# Patient Record
Sex: FEMALE | Race: WHITE | NOT HISPANIC OR LATINO | Employment: FULL TIME | URBAN - METROPOLITAN AREA
[De-identification: names, ages, dates, MRNs, and addresses within clinical notes are randomized per-mention and may not be internally consistent; named-entity substitution may affect disease eponyms.]

---

## 2023-03-23 LAB
ABO GROUP (TYPE) IN BLOOD: NORMAL
ANION GAP IN SER/PLAS: 11 MMOL/L (ref 10–20)
ANTIBODY SCREEN: NORMAL
CALCIUM (MG/DL) IN SER/PLAS: 9.1 MG/DL (ref 8.6–10.3)
CARBON DIOXIDE, TOTAL (MMOL/L) IN SER/PLAS: 26 MMOL/L (ref 21–32)
CHLORIDE (MMOL/L) IN SER/PLAS: 105 MMOL/L (ref 98–107)
CREATININE (MG/DL) IN SER/PLAS: 0.57 MG/DL (ref 0.5–1.05)
ERYTHROCYTE DISTRIBUTION WIDTH (RATIO) BY AUTOMATED COUNT: 12.5 % (ref 11.5–14.5)
ERYTHROCYTE MEAN CORPUSCULAR HEMOGLOBIN CONCENTRATION (G/DL) BY AUTOMATED: 32.9 G/DL (ref 32–36)
ERYTHROCYTE MEAN CORPUSCULAR VOLUME (FL) BY AUTOMATED COUNT: 93 FL (ref 80–100)
ERYTHROCYTES (10*6/UL) IN BLOOD BY AUTOMATED COUNT: 4.43 X10E12/L (ref 4–5.2)
GFR FEMALE: >90 ML/MIN/1.73M2
GLUCOSE (MG/DL) IN SER/PLAS: 111 MG/DL (ref 74–99)
HEMATOCRIT (%) IN BLOOD BY AUTOMATED COUNT: 41.3 % (ref 36–46)
HEMOGLOBIN (G/DL) IN BLOOD: 13.6 G/DL (ref 12–16)
LEUKOCYTES (10*3/UL) IN BLOOD BY AUTOMATED COUNT: 7.9 X10E9/L (ref 4.4–11.3)
NRBC (PER 100 WBCS) BY AUTOMATED COUNT: 0 /100 WBC (ref 0–0)
PLATELETS (10*3/UL) IN BLOOD AUTOMATED COUNT: 280 X10E9/L (ref 150–450)
POTASSIUM (MMOL/L) IN SER/PLAS: 3.7 MMOL/L (ref 3.5–5.3)
RH FACTOR: NORMAL
SODIUM (MMOL/L) IN SER/PLAS: 138 MMOL/L (ref 136–145)
UREA NITROGEN (MG/DL) IN SER/PLAS: 18 MG/DL (ref 6–23)

## 2023-03-31 LAB — SARS-COV-2 RESULT: NOT DETECTED

## 2023-04-03 ENCOUNTER — HOSPITAL ENCOUNTER (OUTPATIENT)
Dept: DATA CONVERSION | Facility: HOSPITAL | Age: 73
End: 2023-04-03
Attending: ORTHOPAEDIC SURGERY | Admitting: ORTHOPAEDIC SURGERY
Payer: MEDICARE

## 2023-04-03 DIAGNOSIS — Z85.828 PERSONAL HISTORY OF OTHER MALIGNANT NEOPLASM OF SKIN: ICD-10-CM

## 2023-04-03 DIAGNOSIS — E78.5 HYPERLIPIDEMIA, UNSPECIFIED: ICD-10-CM

## 2023-04-03 DIAGNOSIS — M17.11 UNILATERAL PRIMARY OSTEOARTHRITIS, RIGHT KNEE: ICD-10-CM

## 2023-04-03 DIAGNOSIS — Z72.0 TOBACCO USE: ICD-10-CM

## 2023-04-04 LAB
ANION GAP IN SER/PLAS: NORMAL
BASOPHILS (10*3/UL) IN BLOOD BY AUTOMATED COUNT: NORMAL
BASOPHILS/100 LEUKOCYTES IN BLOOD BY AUTOMATED COUNT: NORMAL
CALCIUM (MG/DL) IN SER/PLAS: NORMAL
CARBON DIOXIDE, TOTAL (MMOL/L) IN SER/PLAS: NORMAL
CHLORIDE (MMOL/L) IN SER/PLAS: NORMAL
CREATININE (MG/DL) IN SER/PLAS: NORMAL
EOSINOPHILS (10*3/UL) IN BLOOD BY AUTOMATED COUNT: NORMAL
EOSINOPHILS/100 LEUKOCYTES IN BLOOD BY AUTOMATED COUNT: NORMAL
ERYTHROCYTE DISTRIBUTION WIDTH (RATIO) BY AUTOMATED COUNT: NORMAL
ERYTHROCYTE MEAN CORPUSCULAR HEMOGLOBIN CONCENTRATION (G/DL) BY AUTOMATED: NORMAL
ERYTHROCYTE MEAN CORPUSCULAR VOLUME (FL) BY AUTOMATED COUNT: NORMAL
ERYTHROCYTES (10*6/UL) IN BLOOD BY AUTOMATED COUNT: NORMAL
GFR FEMALE: NORMAL
GFR MALE: NORMAL
GLUCOSE (MG/DL) IN SER/PLAS: NORMAL
HEMATOCRIT (%) IN BLOOD BY AUTOMATED COUNT: NORMAL
HEMOGLOBIN (G/DL) IN BLOOD: NORMAL
IMMATURE GRANULOCYTES/100 LEUKOCYTES IN BLOOD BY AUTOMATED COUNT: NORMAL
LEUKOCYTES (10*3/UL) IN BLOOD BY AUTOMATED COUNT: NORMAL
LYMPHOCYTES (10*3/UL) IN BLOOD BY AUTOMATED COUNT: NORMAL
LYMPHOCYTES/100 LEUKOCYTES IN BLOOD BY AUTOMATED COUNT: NORMAL
MANUAL DIFFERENTIAL Y/N: NORMAL
MONOCYTES (10*3/UL) IN BLOOD BY AUTOMATED COUNT: NORMAL
MONOCYTES/100 LEUKOCYTES IN BLOOD BY AUTOMATED COUNT: NORMAL
NEUTROPHILS (10*3/UL) IN BLOOD BY AUTOMATED COUNT: NORMAL
NEUTROPHILS/100 LEUKOCYTES IN BLOOD BY AUTOMATED COUNT: NORMAL
NRBC (PER 100 WBCS) BY AUTOMATED COUNT: NORMAL
PLATELETS (10*3/UL) IN BLOOD AUTOMATED COUNT: NORMAL
POTASSIUM (MMOL/L) IN SER/PLAS: NORMAL
SODIUM (MMOL/L) IN SER/PLAS: NORMAL
UREA NITROGEN (MG/DL) IN SER/PLAS: NORMAL

## 2023-09-06 VITALS — HEIGHT: 62 IN | BODY MASS INDEX: 23.61 KG/M2 | WEIGHT: 128.31 LBS

## 2023-09-14 NOTE — DISCHARGE SUMMARY
Send Summary:   Discharge Summary Providers:  Provider Role Provider Name   · Attending Shaq Recinos   · Referring Shaq Recinos   · Primary Viktoriya Souza       Note Recipients: Viktoriya Souza MD Lew, Michael, MD       Discharge:    Summary:   Admission Date: .03-Apr-2023 06:08:00   Discharge Date: 03-Apr-2023   Attending Physician at Discharge: Shaq Recinos   Admission Reason: osteoarthritis right knee   Final Discharge Diagnoses: Osteoarthritis of right  knee   Procedures: Date: 03-Apr-2023 09:45:00  Procedure Name: Right Total Knee Replacement   Condition at Discharge: Satisfactory   Disposition at Discharge: Home Health Care - New   Vital Signs:          Date:            Weight/Scale Type:  Height:   03-Apr-2023 07:23  58.2  kg         157.4  cm  Physical Exam:    Constitutional: Well developed, awake/alert,  no distress, alert and cooperative   Eyes: EOMI, clear sclera   ENMT: mucous membranes moist, no lesions  seen   Respiratory/Thorax: Patent airways, with  good chest expansion, thorax symmetric   Musculoskeletal: right knee dressing dry  and intact.   Bilateral lower extremities distally with intact dorsiflexion/plantarflexion/EHL.  DP palpable 2+/2   Neurological: alert,  intact senses   Lymphatic: No significant lymphadenopathy   Psychological: Appropriate mood and behavior     Hospital Course:    Hospital day #1, postoperative day #0 of right total knee arthroplasty for osteoarthritis right knee.  Patient had satisfactory postop course with no major complications.   She fully participated in physical and Occupational Therapy.  Used her incentive spirometry as directed.  Tolerated diet with no nausea vomiting, passed flatus, and urinated well.  Discharge to home with home health care in satisfactory condition.    Immunizations:    Immunizations:  01-Dec-2021   SARS-CoV-2 (COVID-19): Immunizations, 22-Nov-2021 27-Jan-2021   SARS-CoV-2 (COVID-19): Immunizations, 27-Abdirashid-2021  30-Dec-2020   SARS-CoV-2  (COVID-19): Immunizations, 30-Dec-2020      Discharge Information:    and Continuing Care:   Lab Results - Pending:    None  Radiology Results - Pending: None   Discharge Instructions:    Activity:           activity as tolerated.          May shower..            May not return to school/work.            May not drive.      Nutrition/Diet:           resume normal diet    Wound Care:           Wound Site:   right knee incision          Wound Type:   surgical incision          Instructions:   no lotions, creams, or tub soaks          Other Instructions:   Your right knee dressing is waterproof, you may shower with it on  Remove your right knee dressing on 4/10, you may leave it open to air if incision is dry.    Additional Orders:           Additional Instructions:   Call  for any problems and/or concerns.  *Weight bearing as tolerated with walker  *Maintain knee precautions  *No pillow under affected knee  *wear ace wrap around right knee for 3 days after your surgery: remove ace wrap for shower, pat leg dry and reapply ace wrap  *Raise (elevate) your leg above the level of your heart while you are sitting or lying down (place pillow underneath calf)  *You may shower, do not soak or scrub incision; pat dry  *Apply ice to affected knee as needed to minimize swelling, on 20 minutes, off 20 minutes  *Move your toes often to avoid stiffness and to lessen swelling  *Avoid sitting for a long time without moving. Get up to take short walks every 1-2 hours. This is important to improve blood flow and breathing. Ask for help if you feel weak or unsteady  *Continue the coughing and deep breathing exercises that you learned in the hospital    Call Doctor right away for:  *Fever of 100.4 or above, shaking chills  *Stiffness, or inability to move the knee  *Increased swelling in your leg  *Increased redness, tenderness, or swelling in or around the knee incision  *Drainage from the knee incision  *Increased knee pain  *An  incision that breaks open  *Chest pain/shortness of breath-call 911    After the procedure you can expect pain, swelling, and limited range of motion    Narcotic pain medication may cause constipation. You may need to take actions to prevent or treat constipation, such as:  -drink enough fluid to keep your urine pale yellow  -take over-the-counter or prescription medicines  -eat foods that are high in fiber, such as beans, whole grains, and fresh fruits and vegetables  -limit foods that are high in fat and processed sugars, such as fried or sweet foods    Take your blood thinner (anticoagulant) as prescribed by your physician to prevent blood clots.   If your physician prescribed LLOYD Hose (compression stockings)-wear as instructed as they will help reduce swelling and the formation of blood clots    Do not use any products that contain nicotine or tobacco, such as cigarettes, e-cigarettes, and chewing tobacco. These can delay healing after surgery. If you need help quitting, ask your health care provider.        Home Care Certification:           Home Care Agency:   Other (with phone number)          Skilled Disciplines Ordered:   PT,  OT    Home Care Services:           Home Care Skilled Service:   Rehab (PT/OT/SP eval and treat)    Follow Up Appointments:    Follow-Up Appointment 01:           Physician/Dept/Service:   Dr. Shaq Recinos          Reason for Referral:   First postop visit/incision check          Call to Schedule in:   2-3 weeks after your surgery          Phone Number:   [phone]    Discharge Medications: Home Medication   Calcium 600+D 600 mg-5 mcg (200 intl units) oral tablet - 1 tab(s) orally once a day  rosuvastatin 40 mg oral tablet - 1 tab(s) orally once a day  buPROPion 150 mg/12 hours (SR) oral tablet, extended release - 1 tab(s) orally 2 times a day  aspirin 81 mg oral delayed release tablet - 1 tab(s) orally 2 times a day for 30 days to help reduce your risk for blood clots  docusate  sodium 100 mg oral capsule - 1 cap(s) orally 2 times a day -for constipation   CeleBREX 100 mg oral capsule - 1 cap(s) orally 2 times a day      PRN Medication   oxyCODONE 5 mg oral tablet - 1 tab(s) orally every 6 hours, As Needed -Pain - moderate to severe     DNR Status:   ·  Code Status Code Status order at time of discharge: Full Code       Electronic Signatures:  Bhavna Byrnes (PAC)  (Signed 03-Apr-2023 16:51)   Authored: Send Summary, Summary Content, Immunizations,  Ongoing Care, DNR Status, Note Completion      Last Updated: 03-Apr-2023 16:51 by Bhavna Byrnes (PAC)

## 2023-09-14 NOTE — PROGRESS NOTES
Service: Orthopaedics     Subjective Data:   MAEVE VEGA is a 72 year old Female who is Hospital Day # 1 and POD #0 for Right Total Knee Replacement.    Additional Information:    Ms. Vega is sitting up in bed, has just finished lunch. She describes minimal right knee discomfort. Still has lingering buttock numbness. She is able to demonstrate  right SLR.     Objective Data:     Objective Information:          Pain reported at 4/3 12:06: 3 = Mild    Physical Exam by System:    Constitutional: Well developed, awake/alert, no distress,  alert and cooperative   Eyes: EOMI, clear sclera   ENMT: mucous membranes moist, no lesions seen   Respiratory/Thorax: Patent airways, with good chest  expansion, thorax symmetric   Musculoskeletal: right knee dressing dry and intact.    Bilateral lower extremities distally with intact dorsiflexion/plantarflexion/EHL.  DP palpable 2+/2   Neurological: alert,  intact senses   Lymphatic: No significant lymphadenopathy   Psychological: Appropriate mood and behavior     Medication:    Medications:          Continuous Medications       --------------------------------    1. Sodium Chloride 0.9% Infusion:  1000  mL  IntraVenous  <Continuous>         Scheduled Medications       --------------------------------    1. Acetaminophen:  650  mg  Oral  Every 6 Hours    2. Aspirin Enteric Coated:  81  mg  Oral  2 Times a Day    3. buPROPion (WELLBUTRIN SR) Extended Release (12 hour):  150  mg  Oral  Every 12 Hours    4. ceFAZolin 2 gram/ D5W 100 mL Premix IVPB:  100  mL  IntraVenous Piggyback  Once    5. ceFAZolin 2 gram/ D5W 100 mL Premix IVPB:  100  mL  IntraVenous Piggyback  Once    6. Docusate:  100  mg  Oral  2 Times a Day    7. Polyethylene Glycol:  17  gram(s)  Oral  2 Times a Day    8. Tranexamic Acid:  1950  mg  Oral  Once    9. Tranexamic Acid:  1950  mg  Oral  Once         PRN Medications       --------------------------------    1. Ketorolac Injectable:  15  mg  IntraVenous Push   Every 6 Hours    2. Morphine Injectable:  2  mg  IntraVenous Push  Every 4 Hours    3. Ondansetron Injectable:  4  mg  IntraVenous Push  Every 6 Hours    4. oxyCODONE Immediate Release:  5  mg  Oral  Every 4 Hours    5. oxyCODONE Immediate Release:  10  mg  Oral  Every 4 Hours        Assessment and Plan:        Admitting Dx:   Osteoarthritis of right knee: Entered Date: 2023 08:10       Additional Dx:   Postoperative pain: Entered Date: 2023 13:39   Family history of coronary artery disease: Entered Date:  11-Oct-2021 20:26   Tobacco use: Entered Date: 11-Oct-2021 20:26   Hyperlipidemia: Entered Date: 11-Oct-2021 20:26   Chest pain: Entered Date: 11-Oct-2021 20:12   Preop testing: Onset Date: 2021, Entered Date: 2021  15:32       Medical History:   Nasal obstruction: Entered Date: 2021 13:50   Nasal congestion: Entered Date: 2021 13:50   Chondromalacia of lateral tibial plateau: Entered Date: 2021  08:52   Chondromalacia of medial femoral condyle: Entered Date: 2021  08:52   Tear of medial meniscus of knee: Entered Date: 2021  08:52   History of hyperlipidemia: Entered Date: 15-Abdirashid-2021 07:48   History of abnormal mammogram: Entered Date: 15-Abdirashid-2021  07:48   Syringoma of face: Entered Date: 15-Abdirashid-2021 07:47   Snoring: Entered Date: 15-Abdirashid-2021 07:47   Postnasal drip: Entered Date: 15-Abdirashid-2021 07:47   Nasal turbinate hypertrophy: Entered Date: 15-Abdirashid-2021 07:47   History of basal cell cancer: Entered Date: 15-Abdirashid-2021 07:46   Cyst of right eyelid: Entered Date: 15-Abdirashid-2021 07:46   Cyst of left eyelid: Entered Date: 15-Abdirashid-2021 07:46   Allergic rhinitis: Entered Date: 15-Abdirashid-2021 07:46   Acute sinusitis: Entered Date: 15-Abdirashid-2021 07:45       Surg History:   History of  section: Entered Date: 2021 07:38   History of cataract surgery: Entered Date: 2021 07:38   History of breast biopsy: Entered Date: 29-Sep-2021 07:21    Code  Status:  ·  Code Status Full Code       Impression 1: Osteoarthritis of right knee   Plan for Impression 1: Postop day #1 status post  right total knee arthroplasty  PT/OT, weightbearing as tolerated right lower extremity  Pain regimen: Good pain control with intermittent oxycodone and Toradol   Bowel regimen: colace and miralax  VTE prophylaxis: Aspirin 81 mg twice daily and SCDs   disposition: When appropriate, will discharge home with home health care, face-to-face is completed  follow up with Dr. Recinos in 2-3 weeks       Electronic Signatures:  Bhavna Byrnes (PAC)  (Signed 03-Apr-2023 13:47)   Authored: Service, Subjective Data, Objective Data, Assessment  and Plan, Note Completion      Last Updated: 03-Apr-2023 13:47 by Bhavna Byrnes (PAC)

## 2023-09-14 NOTE — H&P
History & Physical Reviewed:   I have reviewed the History and Physical dated:  03-Apr-2023   History and Physical reviewed and relevant findings noted. Patient examined to review pertinent physical  findings.: No significant changes   Home Medications Reviewed: no changes noted   Allergies Reviewed: no changes noted       ERAS (Enhanced Recovery After Surgery):  ·  ERAS Patient: no     Consent:   COVID-19 Consent:  ·  COVID-19 Risk Consent Surgeon has reviewed key risks related to the risk of chelle COVID-19 and if they contract COVID-19 what the risks are.       Electronic Signatures:  Shaq Recinos)  (Signed 03-Apr-2023 08:06)   Authored: History & Physical Reviewed, ERAS, Consent,  Note Completion      Last Updated: 03-Apr-2023 08:06 by Shaq Recinos)

## 2023-10-02 NOTE — OP NOTE
Post Operative Note:     PreOp Diagnosis: Right Knee Osteoarthritis   Post-Procedure Diagnosis: Same   Procedure: Right Total Knee Replacement   Surgeon: Dr Shaq Recinos   Resident/Fellow/Other Assistant: Jeffrey Laboy RN   Anesthesia: Spinal   Estimated Blood Loss (mL): less than 100cc   Specimen: no   Findings: severe medial compartment disease   Drains and/or Catheters: none   Tourniquet Times: 19 minutes   Additional Details: The patient was brought to the  operating suite identified and induced into successful spinal anesthesia. Next the right lower extremity was prepped and draped in the usual sterile fashion distal to a well-padded tourniquet. The limb was then exsanguinated of blood using gravity, and  the tourniquet was then inflated to 250 mmhg. A timeout was then performed.  Incision was made on the anterior aspect of the knee, midline dissection was carried out through the subcutaneous tissue until the extensor mechanism was then encountered. A medial parapatellar arthrotomy was performed using the Bovie cautery, the patella  was carefully inverted revealing the following arthritic findings: Severe osteoarthritis involving the medial compartment, there is also significant loss of articular cartilage involving the medial facet of the patellofemoral joint.. Total knee replacement  then began by making a drill hole in the intracondylar notch, the intramedullary oleg with a distal femoral cutting guide was set at 4 degrees of valgus. Distal femur was then cut using an oscillating saw. The rotational guide was then inserted and the  distal femur was sized to a number 3. The anterior posterior and chamfer cuts were then made using an oscillating saw.  The anterior cruciate ligament was removed. The PCL was  protected a medial release was performed and the proximal tibial subluxed anteriorally. The proximal tibial cut was then made using an oscillating saw, neutral medial to lateral with a slight posterior   slope. The undersurface of the patella was also removed using oscillating saw. The following implants were then used to trial reduced the knee: 3 femoral component, 3 tibial component, 32 mm patella, 9 mm liner trial  The knee was then found to be very balanced in all ranges and for this reason the surfaces of bone were prepared by drilling  holes in the distal femur and the undersurface of the patella, A V keel slot and 4  holes were drilled in the proximal  tibia.   The tourniquet was then released at this time and all obvious bleeding points were carefully coagulated. The  implants were inserted in the following order : 3 triathlon Tritanium Tibia baseplate, 9 mm trial insert, 3 triathlon Press Fit Cruciate Retaining  femoral component, and the 32 mm triathlon Tritanium metal backed patellar component. Once all implants were confirmed to be bone to bone the permanent 9 mm Traithlon CR polyethylene insert was then secured to the tibial baseplate, making sure there was  no soft tissue impingement.  The medial parapatellar incision was closed using figure-of-eight absorbable sutures, and running barbed #1 monofilament suture. The subcutaneous tissue was closed with interrupted and running absorbable suture, and a Prineo dressing was then used to  seal the incision. Sterile dressing applied the patient was then aroused from anesthesia and transferred to the postanesthesia care unit in stable condition recover fully from this anesthesia. All instrument and needle counts were correct.  The first assistant, physician assistant, was present for the entire operation and was a key portion of the surgical team, being responsible for aiding in positioning exposure vital organ protection and closure.       Electronic Signatures:  Shaq Recinos)  (Signed 03-Apr-2023 10:08)   Authored: Post Operative Note, Note Completion      Last Updated: 03-Apr-2023 10:08 by Shaq Recinos)

## 2023-10-12 ENCOUNTER — HOSPITAL ENCOUNTER (OUTPATIENT)
Dept: CARDIOLOGY | Facility: HOSPITAL | Age: 73
Discharge: HOME | End: 2023-10-12
Payer: MEDICARE

## 2023-10-12 DIAGNOSIS — R60.0 LOCALIZED EDEMA: ICD-10-CM

## 2023-10-12 DIAGNOSIS — M79.605 LEFT LEG PAIN: ICD-10-CM

## 2023-10-12 DIAGNOSIS — R22.1 LOCALIZED SWELLING, MASS AND LUMP, NECK: ICD-10-CM

## 2023-10-12 DIAGNOSIS — M79.604 RIGHT LEG PAIN: ICD-10-CM

## 2023-10-12 PROCEDURE — 93970 EXTREMITY STUDY: CPT | Performed by: INTERNAL MEDICINE

## 2023-10-12 PROCEDURE — 93970 EXTREMITY STUDY: CPT

## 2024-04-18 ENCOUNTER — LAB (OUTPATIENT)
Dept: LAB | Facility: LAB | Age: 74
End: 2024-04-18
Payer: MEDICARE

## 2024-04-18 DIAGNOSIS — M81.0 AGE-RELATED OSTEOPOROSIS WITHOUT CURRENT PATHOLOGICAL FRACTURE: ICD-10-CM

## 2024-04-18 DIAGNOSIS — R73.03 PREDIABETES: ICD-10-CM

## 2024-04-18 DIAGNOSIS — R53.83 OTHER FATIGUE: ICD-10-CM

## 2024-04-18 DIAGNOSIS — E78.2 MIXED HYPERLIPIDEMIA: Primary | ICD-10-CM

## 2024-04-18 LAB
25(OH)D3 SERPL-MCNC: 26 NG/ML (ref 30–100)
ALBUMIN SERPL BCP-MCNC: 4.4 G/DL (ref 3.4–5)
ALP SERPL-CCNC: 55 U/L (ref 33–136)
ALT SERPL W P-5'-P-CCNC: 20 U/L (ref 7–45)
ANION GAP SERPL CALC-SCNC: 13 MMOL/L (ref 10–20)
APPEARANCE UR: CLEAR
AST SERPL W P-5'-P-CCNC: 21 U/L (ref 9–39)
BASOPHILS # BLD AUTO: 0.04 X10*3/UL (ref 0–0.1)
BASOPHILS NFR BLD AUTO: 0.6 %
BILIRUB SERPL-MCNC: 0.6 MG/DL (ref 0–1.2)
BILIRUB UR STRIP.AUTO-MCNC: NEGATIVE MG/DL
BUN SERPL-MCNC: 21 MG/DL (ref 6–23)
CALCIUM SERPL-MCNC: 9.5 MG/DL (ref 8.6–10.3)
CHLORIDE SERPL-SCNC: 107 MMOL/L (ref 98–107)
CHOLEST SERPL-MCNC: 167 MG/DL (ref 0–199)
CHOLESTEROL/HDL RATIO: 2.3
CO2 SERPL-SCNC: 24 MMOL/L (ref 21–32)
COLOR UR: YELLOW
CREAT SERPL-MCNC: 0.49 MG/DL (ref 0.5–1.05)
EGFRCR SERPLBLD CKD-EPI 2021: >90 ML/MIN/1.73M*2
EOSINOPHIL # BLD AUTO: 0.17 X10*3/UL (ref 0–0.4)
EOSINOPHIL NFR BLD AUTO: 2.6 %
ERYTHROCYTE [DISTWIDTH] IN BLOOD BY AUTOMATED COUNT: 13.4 % (ref 11.5–14.5)
EST. AVERAGE GLUCOSE BLD GHB EST-MCNC: 111 MG/DL
GLUCOSE SERPL-MCNC: 88 MG/DL (ref 74–99)
GLUCOSE UR STRIP.AUTO-MCNC: NEGATIVE MG/DL
HBA1C MFR BLD: 5.5 %
HCT VFR BLD AUTO: 42.5 % (ref 36–46)
HDLC SERPL-MCNC: 71.1 MG/DL
HGB BLD-MCNC: 14.4 G/DL (ref 12–16)
IMM GRANULOCYTES # BLD AUTO: 0.02 X10*3/UL (ref 0–0.5)
IMM GRANULOCYTES NFR BLD AUTO: 0.3 % (ref 0–0.9)
KETONES UR STRIP.AUTO-MCNC: NEGATIVE MG/DL
LDLC SERPL CALC-MCNC: 74 MG/DL
LEUKOCYTE ESTERASE UR QL STRIP.AUTO: ABNORMAL
LYMPHOCYTES # BLD AUTO: 2.51 X10*3/UL (ref 0.8–3)
LYMPHOCYTES NFR BLD AUTO: 38.5 %
MCH RBC QN AUTO: 29.6 PG (ref 26–34)
MCHC RBC AUTO-ENTMCNC: 33.9 G/DL (ref 32–36)
MCV RBC AUTO: 87 FL (ref 80–100)
MONOCYTES # BLD AUTO: 0.56 X10*3/UL (ref 0.05–0.8)
MONOCYTES NFR BLD AUTO: 8.6 %
NEUTROPHILS # BLD AUTO: 3.22 X10*3/UL (ref 1.6–5.5)
NEUTROPHILS NFR BLD AUTO: 49.4 %
NITRITE UR QL STRIP.AUTO: NEGATIVE
NON HDL CHOLESTEROL: 96 MG/DL (ref 0–149)
NRBC BLD-RTO: 0 /100 WBCS (ref 0–0)
PH UR STRIP.AUTO: 5 [PH]
PLATELET # BLD AUTO: 290 X10*3/UL (ref 150–450)
POTASSIUM SERPL-SCNC: 4 MMOL/L (ref 3.5–5.3)
PROT SERPL-MCNC: 6.4 G/DL (ref 6.4–8.2)
PROT UR STRIP.AUTO-MCNC: NEGATIVE MG/DL
RBC # BLD AUTO: 4.86 X10*6/UL (ref 4–5.2)
RBC # UR STRIP.AUTO: NEGATIVE /UL
RBC #/AREA URNS AUTO: NORMAL /HPF
SODIUM SERPL-SCNC: 140 MMOL/L (ref 136–145)
SP GR UR STRIP.AUTO: 1.02
TRIGL SERPL-MCNC: 108 MG/DL (ref 0–149)
TSH SERPL-ACNC: 1.45 MIU/L (ref 0.44–3.98)
UROBILINOGEN UR STRIP.AUTO-MCNC: 2 MG/DL
VLDL: 22 MG/DL (ref 0–40)
WBC # BLD AUTO: 6.5 X10*3/UL (ref 4.4–11.3)
WBC #/AREA URNS AUTO: NORMAL /HPF

## 2024-04-18 PROCEDURE — 81001 URINALYSIS AUTO W/SCOPE: CPT

## 2024-04-18 PROCEDURE — 82306 VITAMIN D 25 HYDROXY: CPT

## 2024-04-18 PROCEDURE — 85025 COMPLETE CBC W/AUTO DIFF WBC: CPT

## 2024-04-18 PROCEDURE — 80053 COMPREHEN METABOLIC PANEL: CPT

## 2024-04-18 PROCEDURE — 36415 COLL VENOUS BLD VENIPUNCTURE: CPT

## 2024-04-18 PROCEDURE — 84443 ASSAY THYROID STIM HORMONE: CPT

## 2024-04-18 PROCEDURE — 83036 HEMOGLOBIN GLYCOSYLATED A1C: CPT

## 2024-04-18 PROCEDURE — 80061 LIPID PANEL: CPT

## 2024-04-23 ENCOUNTER — APPOINTMENT (OUTPATIENT)
Dept: RADIOLOGY | Facility: HOSPITAL | Age: 74
End: 2024-04-23
Payer: MEDICARE

## 2024-04-23 ENCOUNTER — HOSPITAL ENCOUNTER (OUTPATIENT)
Dept: RADIOLOGY | Facility: HOSPITAL | Age: 74
Discharge: HOME | End: 2024-04-23
Payer: MEDICARE

## 2024-04-23 VITALS — HEIGHT: 62 IN | BODY MASS INDEX: 23.19 KG/M2 | WEIGHT: 126 LBS

## 2024-04-23 DIAGNOSIS — Z12.31 ENCOUNTER FOR SCREENING MAMMOGRAM FOR MALIGNANT NEOPLASM OF BREAST: ICD-10-CM

## 2024-04-23 PROCEDURE — 77063 BREAST TOMOSYNTHESIS BI: CPT | Performed by: RADIOLOGY

## 2024-04-23 PROCEDURE — 77067 SCR MAMMO BI INCL CAD: CPT

## 2024-04-23 PROCEDURE — 77067 SCR MAMMO BI INCL CAD: CPT | Performed by: RADIOLOGY

## 2024-04-24 ENCOUNTER — HOSPITAL ENCOUNTER (OUTPATIENT)
Dept: RADIOLOGY | Facility: HOSPITAL | Age: 74
Discharge: HOME | End: 2024-04-24
Payer: MEDICARE

## 2024-04-24 DIAGNOSIS — M81.0 AGE-RELATED OSTEOPOROSIS WITHOUT CURRENT PATHOLOGICAL FRACTURE: ICD-10-CM

## 2024-04-24 DIAGNOSIS — M85.88 OTHER SPECIFIED DISORDERS OF BONE DENSITY AND STRUCTURE, OTHER SITE: ICD-10-CM

## 2024-04-24 PROCEDURE — 77080 DXA BONE DENSITY AXIAL: CPT

## 2024-04-24 PROCEDURE — 77080 DXA BONE DENSITY AXIAL: CPT | Performed by: RADIOLOGY

## 2024-07-24 ENCOUNTER — PRE-ADMISSION TESTING (OUTPATIENT)
Dept: PREADMISSION TESTING | Facility: HOSPITAL | Age: 74
End: 2024-07-24
Payer: MEDICARE

## 2024-07-24 ENCOUNTER — LAB (OUTPATIENT)
Dept: LAB | Facility: LAB | Age: 74
End: 2024-07-24
Payer: MEDICARE

## 2024-07-24 VITALS
OXYGEN SATURATION: 96 % | RESPIRATION RATE: 12 BRPM | BODY MASS INDEX: 23.69 KG/M2 | HEIGHT: 62 IN | DIASTOLIC BLOOD PRESSURE: 76 MMHG | TEMPERATURE: 97.7 F | HEART RATE: 66 BPM | SYSTOLIC BLOOD PRESSURE: 140 MMHG | WEIGHT: 128.75 LBS

## 2024-07-24 DIAGNOSIS — Z01.818 PRE-OP TESTING: Primary | ICD-10-CM

## 2024-07-24 DIAGNOSIS — Z01.818 PRE-OP TESTING: ICD-10-CM

## 2024-07-24 LAB
ANION GAP SERPL CALC-SCNC: 11 MMOL/L (ref 10–20)
BASOPHILS # BLD AUTO: 0.06 X10*3/UL (ref 0–0.1)
BASOPHILS NFR BLD AUTO: 0.7 %
BUN SERPL-MCNC: 22 MG/DL (ref 6–23)
CALCIUM SERPL-MCNC: 8.8 MG/DL (ref 8.6–10.3)
CHLORIDE SERPL-SCNC: 105 MMOL/L (ref 98–107)
CO2 SERPL-SCNC: 27 MMOL/L (ref 21–32)
CREAT SERPL-MCNC: 0.58 MG/DL (ref 0.5–1.05)
EGFRCR SERPLBLD CKD-EPI 2021: >90 ML/MIN/1.73M*2
EOSINOPHIL # BLD AUTO: 0.17 X10*3/UL (ref 0–0.4)
EOSINOPHIL NFR BLD AUTO: 2 %
ERYTHROCYTE [DISTWIDTH] IN BLOOD BY AUTOMATED COUNT: 12.7 % (ref 11.5–14.5)
GLUCOSE SERPL-MCNC: 121 MG/DL (ref 74–99)
HCT VFR BLD AUTO: 40.5 % (ref 36–46)
HGB BLD-MCNC: 13.1 G/DL (ref 12–16)
IMM GRANULOCYTES # BLD AUTO: 0.02 X10*3/UL (ref 0–0.5)
IMM GRANULOCYTES NFR BLD AUTO: 0.2 % (ref 0–0.9)
LYMPHOCYTES # BLD AUTO: 3.14 X10*3/UL (ref 0.8–3)
LYMPHOCYTES NFR BLD AUTO: 37.7 %
MCH RBC QN AUTO: 30.3 PG (ref 26–34)
MCHC RBC AUTO-ENTMCNC: 32.3 G/DL (ref 32–36)
MCV RBC AUTO: 94 FL (ref 80–100)
MONOCYTES # BLD AUTO: 0.72 X10*3/UL (ref 0.05–0.8)
MONOCYTES NFR BLD AUTO: 8.6 %
NEUTROPHILS # BLD AUTO: 4.22 X10*3/UL (ref 1.6–5.5)
NEUTROPHILS NFR BLD AUTO: 50.8 %
NRBC BLD-RTO: 0 /100 WBCS (ref 0–0)
PLATELET # BLD AUTO: 264 X10*3/UL (ref 150–450)
POTASSIUM SERPL-SCNC: 3.7 MMOL/L (ref 3.5–5.3)
RBC # BLD AUTO: 4.32 X10*6/UL (ref 4–5.2)
SODIUM SERPL-SCNC: 139 MMOL/L (ref 136–145)
WBC # BLD AUTO: 8.3 X10*3/UL (ref 4.4–11.3)

## 2024-07-24 PROCEDURE — 87081 CULTURE SCREEN ONLY: CPT | Mod: STJLAB

## 2024-07-24 PROCEDURE — 99202 OFFICE O/P NEW SF 15 MIN: CPT | Performed by: NURSE PRACTITIONER

## 2024-07-24 PROCEDURE — 85025 COMPLETE CBC W/AUTO DIFF WBC: CPT

## 2024-07-24 PROCEDURE — 93005 ELECTROCARDIOGRAM TRACING: CPT

## 2024-07-24 PROCEDURE — 36415 COLL VENOUS BLD VENIPUNCTURE: CPT

## 2024-07-24 PROCEDURE — 93010 ELECTROCARDIOGRAM REPORT: CPT | Performed by: INTERNAL MEDICINE

## 2024-07-24 PROCEDURE — 80048 BASIC METABOLIC PNL TOTAL CA: CPT

## 2024-07-24 RX ORDER — PAROXETINE HYDROCHLORIDE 20 MG/1
20 TABLET, FILM COATED ORAL DAILY
COMMUNITY

## 2024-07-24 RX ORDER — ROSUVASTATIN CALCIUM 40 MG/1
40 TABLET, COATED ORAL DAILY
COMMUNITY

## 2024-07-24 RX ORDER — CELECOXIB 200 MG/1
200 CAPSULE ORAL DAILY
COMMUNITY
End: 2024-07-24 | Stop reason: ENTERED-IN-ERROR

## 2024-07-24 RX ORDER — CHLORHEXIDINE GLUCONATE ORAL RINSE 1.2 MG/ML
SOLUTION DENTAL
Qty: 473 ML | Refills: 0 | Status: SHIPPED | OUTPATIENT
Start: 2024-07-24

## 2024-07-24 ASSESSMENT — DUKE ACTIVITY SCORE INDEX (DASI)
CAN YOU DO LIGHT WORK AROUND THE HOUSE LIKE DUSTING OR WASHING DISHES: YES
CAN YOU DO YARD WORK LIKE RAKING LEAVES, WEEDING OR PUSHING A MOWER: NO
CAN YOU PARTICIPATE IN MODERATE RECREATIONAL ACTIVITIES LIKE GOLF, BOWLING, DANCING, DOUBLES TENNIS OR THROWING A BASEBALL OR FOOTBALL: NO
CAN YOU TAKE CARE OF YOURSELF (EAT, DRESS, BATHE, OR USE TOILET): YES
CAN YOU RUN A SHORT DISTANCE: NO
CAN YOU WALK INDOORS, SUCH AS AROUND YOUR HOUSE: YES
CAN YOU DO MODERATE WORK AROUND THE HOUSE LIKE VACUUMING, SWEEPING FLOORS OR CARRYING GROCERIES: YES
CAN YOU PARTICIPATE IN STRENOUS SPORTS LIKE SWIMMING, SINGLES TENNIS, FOOTBALL, BASKETBALL, OR SKIING: NO
DASI METS SCORE: 5.4
CAN YOU DO HEAVY WORK AROUND THE HOUSE LIKE SCRUBBING FLOORS OR LIFTING AND MOVING HEAVY FURNITURE: NO
CAN YOU CLIMB A FLIGHT OF STAIRS OR WALK UP A HILL: YES
CAN YOU WALK A BLOCK OR TWO ON LEVEL GROUND: NO
TOTAL_SCORE: 21.45
CAN YOU HAVE SEXUAL RELATIONS: YES

## 2024-07-24 ASSESSMENT — PAIN - FUNCTIONAL ASSESSMENT: PAIN_FUNCTIONAL_ASSESSMENT: 0-10

## 2024-07-24 ASSESSMENT — ACTIVITIES OF DAILY LIVING (ADL): ADL_SCORE: 0

## 2024-07-24 ASSESSMENT — ENCOUNTER SYMPTOMS
ENDOCRINE NEGATIVE: 1
CONSTITUTIONAL NEGATIVE: 1
NECK NEGATIVE: 1
GASTROINTESTINAL NEGATIVE: 1
EYES NEGATIVE: 1
NEUROLOGICAL NEGATIVE: 1

## 2024-07-24 ASSESSMENT — LIFESTYLE VARIABLES: SMOKING_STATUS: SMOKER

## 2024-07-24 NOTE — PREPROCEDURE INSTRUCTIONS
Medication List            Accurate as of July 24, 2024  2:31 PM. Always use your most recent med list.                chlorhexidine 0.12 % solution  Commonly known as: Peridex  Sig: 15 mls swish and spit the night before surgery and 15mls swish and spit the morning of surgery do not swallow  Notes to patient: Take as instructe     PARoxetine 20 mg tablet  Commonly known as: Paxil  Medication Adjustments for Surgery: Take morning of surgery with sip of water, no other fluids     rosuvastatin 40 mg tablet  Commonly known as: Crestor  Medication Adjustments for Surgery: Take morning of surgery with sip of water, no other fluids                                  PRE-OPERATIVE INSTRUCTIONS    You will receive notification one business day prior to your procedure to confirm your arrival time. It is important that you answer your phone and/or check your messages during this time. If you do not hear from the surgery center by 5 pm. the day before your procedure, please call [phone].     Please enter the building through the Outpatient entrance and take the elevator off the lobby to the 2nd floor then check in at the Outpatient Surgery desk on the 2nd floor.    INSTRUCTIONS:  Talk to your surgeon for instructions if you should stop your aspirin, blood thinner, or diabetes medicines.  DO NOT take any multivitamins or over the counter supplements for 7-10 days before surgery.  If not being admitted, you must have an adult immediately available to drive you home after surgery. We also highly recommend you have someone stay with you for the entire day and night of your surgery.  For children having surgery, a parent or legal guardian must accompany them to the surgery center. If this is not possible, please call [phone] to make additional arrangements.  For adults who are unable to consent or make medical decisions for themselves, a legal guardian or Power of  must accompany them to the surgery center. If  this is not possible, please call [phone] to make additional arrangements.  Wear comfortable, loose fitting clothing.  All jewelry and piercings must be removed. If you are unable to remove an item or have a dermal piercing, please be sure to tell the nurse when you arrive for surgery.  Nail polish and make-up must be removed.  Avoid smoking or consuming alcohol for 24 hours before surgery.  To help prevent infection, please take a shower/bath and wash your hair the night before and/or morning of surgery (or follow other specific bathing instructions provided).    Preoperative Fasting Guidelines    Why must I stop eating and drinking near surgery time?  With sedation, food or liquid in your stomach can enter your lungs causing serious complications  Increases nausea and vomiting    When do I need to stop eating and drinking before my surgery?  Do not eat any solid food after midnight the night before your surgery/procedure unless otherwise instructed by your surgeon.   You may have up to 13.5 ounces of clear liquid until TWO hours before your instructed arrival time to the hospital.  This includes water, black tea/coffee, (no milk or cream) apple juice, and electrolyte drinks (Gatorade).   You may chew gum until TWO hours before your surgery/procedure      If applicable, notify your surgeons office immediately of any new skin changes that occur to the surgical limb.      If you have any questions or concerns, please call Pre-Admission Testing at [phone].

## 2024-07-24 NOTE — CPM/PAT H&P
CPM/PAT Evaluation       Name: Kathie Lott (Kathie Lott)  /Age: 1950/74 y.o.     In-Person       Chief Complaint: knee pain    HPI  This is a very pleasant 75 yo with PmHx of HLD, and OA.  Pt states she had right knee replaced last April.  She now has pain in the left knee that started to worsen the last few months.  She has had steroid and gel injections with no significant improvement.  She has pain with walking and weight bearing.  No recent fever or chills.    Past Medical History:   Diagnosis Date    Cysts of unspecified eye, unspecified eyelid 2019    Epithelial inclusion cyst of eyelid    Edema of left lower eyelid 2019    Edema of left lower eyelid    Edema of right lower eyelid 2019    Edema of right lower eyelid    History of basal cell cancer     Osteoarthritis     Other abnormal and inconclusive findings on diagnostic imaging of breast 2015    Abnormal mammogram    Personal history of other endocrine, nutritional and metabolic disease     History of hyperlipidemia       Past Surgical History:   Procedure Laterality Date    BREAST BIOPSY Left     Biopsy Breast Open    CATARACT EXTRACTION       SECTION, LOW TRANSVERSE      JOINT REPLACEMENT      Right total knee replacement       Patient  has no history on file for sexual activity.    Family History   Problem Relation Name Age of Onset    Cancer Mother         No Known Allergies    Prior to Admission medications    Medication Sig Start Date End Date Taking? Authorizing Provider   celecoxib (CeleBREX) 200 mg capsule Take 1 capsule (200 mg) by mouth once daily.    Historical Provider, MD   PARoxetine (Paxil) 20 mg tablet Take 1 tablet (20 mg) by mouth once daily.    Historical Provider, MD   rosuvastatin (Crestor) 40 mg tablet Take 1 tablet (40 mg) by mouth once daily.    Historical Provider, MD APONTE ROS:   Constitutional:   neg    Neuro/Psych:   neg    Eyes:   neg     use of corrective lenses  Ears:   neg     Nose:   neg    Mouth:   neg    Throat:   neg    Neck:   neg    Cardio:    Hx of of normal stress test in 2022  Respiratory:    Smokes 2 cigarettes a day  Endocrine:   neg    GI:   neg    :   neg    Musculoskeletal:    See HPI  Hematologic:   neg    Skin:   Pt has skin change of left shin- she has seen 2 dermatologist for evaluation  Not infection  She has had skin change for about 10 years       Physical Exam  Constitutional:       Appearance: Normal appearance.   HENT:      Head: Normocephalic and atraumatic.      Nose: Nose normal.      Mouth/Throat:      Mouth: Mucous membranes are moist.   Eyes:      Pupils: Pupils are equal, round, and reactive to light.   Cardiovascular:      Rate and Rhythm: Normal rate and regular rhythm.   Pulmonary:      Effort: Pulmonary effort is normal.      Breath sounds: Normal breath sounds.   Abdominal:      General: Bowel sounds are normal.      Palpations: Abdomen is soft.   Musculoskeletal:      Cervical back: Normal range of motion.      Comments: No ankle edema   Skin:     General: Skin is warm and dry.      Comments: Pt has psoriasis like pink lesion on shin of LLE  No drainage  (Pt reports has had for 10 years)     Neurological:      General: No focal deficit present.      Mental Status: She is alert and oriented to person, place, and time.   Psychiatric:         Mood and Affect: Mood normal.         Behavior: Behavior normal.        Airway: nl rom  Anesthesia:  Patient denies any anesthesia complications.   Teeth:intact    ECG:NSR no acute changes in comparison of ECG of 3/2023      Lab Results   Component Value Date    GLUCOSE 121 (H) 07/24/2024    CALCIUM 8.8 07/24/2024     07/24/2024    K 3.7 07/24/2024    CO2 27 07/24/2024     07/24/2024    BUN 22 07/24/2024    CREATININE 0.58 07/24/2024     Lab Results   Component Value Date    WBC 8.3 07/24/2024    HGB 13.1 07/24/2024    HCT 40.5 07/24/2024    MCV 94 07/24/2024     07/24/2024       Lab Results    Component Value Date    HGBA1C 5.5 04/18/2024     Visit Vitals  /76   Pulse 66   Temp 36.5 °C (97.7 °F) (Temporal)   Resp 12       DASI Risk Score      Flowsheet Row Most Recent Value   DASI SCORE 21.45   METS Score (Will be calculated only when all the questions are answered) 5.4          Caprini DVT Assessment      Flowsheet Row Most Recent Value   DVT Score 12   Current Status Major surgery planned, lasting 2-3 hours, Elective major lower extremity arthroplasty   History Prior major surgery   Age 60-75 years   BMI 30 or less          Modified Frailty Index      Flowsheet Row Most Recent Value   Modified Frailty Index Calculator 0          CHADS2 Stroke Risk         N/A 3 to 100%: High Risk   2 to < 3%: Medium Risk   0 to < 2%: Low Risk     Last Change: N/A          This score determines the patient's risk of having a stroke if the patient has atrial fibrillation.        This score is not applicable to this patient. Components are not calculated.          Revised Cardiac Risk Index      Flowsheet Row Most Recent Value   Revised Cardiac Risk Calculator 0          Apfel Simplified Score    No data to display       Risk Analysis Index Results This Encounter         7/24/2024  1427             OCONNOR Cancer History: Patient does not indicate history of cancer    Total Risk Analysis Index Score Without Cancer: 22    Total Risk Analysis Index Score: 22          Stop Bang Score      Flowsheet Row Most Recent Value   Do you snore loudly? 0   Do you often feel tired or fatigued after your sleep? 1   Has anyone ever observed you stop breathing in your sleep? 0   Do you have or are you being treated for high blood pressure? 0   Recent BMI (Calculated) 23.5   Is BMI greater than 35 kg/m2? 0=No   Age older than 50 years old? 1=Yes   Is your neck circumference greater than 17 inches (Male) or 16 inches (Female)? 0   Gender - Male 0=No   STOP-BANG Total Score 2            Assessment and Plan:     Plan for OR on 8/5 for    Knee Replacement Total Cement Unilat [42423 (CPT®)] Left   Update BMP, CBC with diff, MRSA    Smoking cessation d/w patient

## 2024-07-24 NOTE — H&P (VIEW-ONLY)
CPM/PAT Evaluation       Name: Kathie Lott (Kathie Lott)  /Age: 1950/74 y.o.     In-Person       Chief Complaint: knee pain    HPI  This is a very pleasant 75 yo with PmHx of HLD, and OA.  Pt states she had right knee replaced last April.  She now has pain in the left knee that started to worsen the last few months.  She has had steroid and gel injections with no significant improvement.  She has pain with walking and weight bearing.  No recent fever or chills.    Past Medical History:   Diagnosis Date    Cysts of unspecified eye, unspecified eyelid 2019    Epithelial inclusion cyst of eyelid    Edema of left lower eyelid 2019    Edema of left lower eyelid    Edema of right lower eyelid 2019    Edema of right lower eyelid    History of basal cell cancer     Osteoarthritis     Other abnormal and inconclusive findings on diagnostic imaging of breast 2015    Abnormal mammogram    Personal history of other endocrine, nutritional and metabolic disease     History of hyperlipidemia       Past Surgical History:   Procedure Laterality Date    BREAST BIOPSY Left     Biopsy Breast Open    CATARACT EXTRACTION       SECTION, LOW TRANSVERSE      JOINT REPLACEMENT      Right total knee replacement       Patient  has no history on file for sexual activity.    Family History   Problem Relation Name Age of Onset    Cancer Mother         No Known Allergies    Prior to Admission medications    Medication Sig Start Date End Date Taking? Authorizing Provider   celecoxib (CeleBREX) 200 mg capsule Take 1 capsule (200 mg) by mouth once daily.    Historical Provider, MD   PARoxetine (Paxil) 20 mg tablet Take 1 tablet (20 mg) by mouth once daily.    Historical Provider, MD   rosuvastatin (Crestor) 40 mg tablet Take 1 tablet (40 mg) by mouth once daily.    Historical Provider, MD APONTE ROS:   Constitutional:   neg    Neuro/Psych:   neg    Eyes:   neg     use of corrective lenses  Ears:   neg     Nose:   neg    Mouth:   neg    Throat:   neg    Neck:   neg    Cardio:    Hx of of normal stress test in 2022  Respiratory:    Smokes 2 cigarettes a day  Endocrine:   neg    GI:   neg    :   neg    Musculoskeletal:    See HPI  Hematologic:   neg    Skin:   Pt has skin change of left shin- she has seen 2 dermatologist for evaluation  Not infection  She has had skin change for about 10 years       Physical Exam  Constitutional:       Appearance: Normal appearance.   HENT:      Head: Normocephalic and atraumatic.      Nose: Nose normal.      Mouth/Throat:      Mouth: Mucous membranes are moist.   Eyes:      Pupils: Pupils are equal, round, and reactive to light.   Cardiovascular:      Rate and Rhythm: Normal rate and regular rhythm.   Pulmonary:      Effort: Pulmonary effort is normal.      Breath sounds: Normal breath sounds.   Abdominal:      General: Bowel sounds are normal.      Palpations: Abdomen is soft.   Musculoskeletal:      Cervical back: Normal range of motion.      Comments: No ankle edema   Skin:     General: Skin is warm and dry.      Comments: Pt has psoriasis like pink lesion on shin of LLE  No drainage  (Pt reports has had for 10 years)     Neurological:      General: No focal deficit present.      Mental Status: She is alert and oriented to person, place, and time.   Psychiatric:         Mood and Affect: Mood normal.         Behavior: Behavior normal.        Airway: nl rom  Anesthesia:  Patient denies any anesthesia complications.   Teeth:intact    ECG:NSR no acute changes in comparison of ECG of 3/2023      Lab Results   Component Value Date    GLUCOSE 121 (H) 07/24/2024    CALCIUM 8.8 07/24/2024     07/24/2024    K 3.7 07/24/2024    CO2 27 07/24/2024     07/24/2024    BUN 22 07/24/2024    CREATININE 0.58 07/24/2024     Lab Results   Component Value Date    WBC 8.3 07/24/2024    HGB 13.1 07/24/2024    HCT 40.5 07/24/2024    MCV 94 07/24/2024     07/24/2024       Lab Results    Component Value Date    HGBA1C 5.5 04/18/2024     Visit Vitals  /76   Pulse 66   Temp 36.5 °C (97.7 °F) (Temporal)   Resp 12       DASI Risk Score      Flowsheet Row Most Recent Value   DASI SCORE 21.45   METS Score (Will be calculated only when all the questions are answered) 5.4          Caprini DVT Assessment      Flowsheet Row Most Recent Value   DVT Score 12   Current Status Major surgery planned, lasting 2-3 hours, Elective major lower extremity arthroplasty   History Prior major surgery   Age 60-75 years   BMI 30 or less          Modified Frailty Index      Flowsheet Row Most Recent Value   Modified Frailty Index Calculator 0          CHADS2 Stroke Risk         N/A 3 to 100%: High Risk   2 to < 3%: Medium Risk   0 to < 2%: Low Risk     Last Change: N/A          This score determines the patient's risk of having a stroke if the patient has atrial fibrillation.        This score is not applicable to this patient. Components are not calculated.          Revised Cardiac Risk Index      Flowsheet Row Most Recent Value   Revised Cardiac Risk Calculator 0          Apfel Simplified Score    No data to display       Risk Analysis Index Results This Encounter         7/24/2024  1427             OCONNOR Cancer History: Patient does not indicate history of cancer    Total Risk Analysis Index Score Without Cancer: 22    Total Risk Analysis Index Score: 22          Stop Bang Score      Flowsheet Row Most Recent Value   Do you snore loudly? 0   Do you often feel tired or fatigued after your sleep? 1   Has anyone ever observed you stop breathing in your sleep? 0   Do you have or are you being treated for high blood pressure? 0   Recent BMI (Calculated) 23.5   Is BMI greater than 35 kg/m2? 0=No   Age older than 50 years old? 1=Yes   Is your neck circumference greater than 17 inches (Male) or 16 inches (Female)? 0   Gender - Male 0=No   STOP-BANG Total Score 2            Assessment and Plan:     Plan for OR on 8/5 for    Knee Replacement Total Cement Unilat [66584 (CPT®)] Left   Update BMP, CBC with diff, MRSA    Smoking cessation d/w patient

## 2024-07-25 ENCOUNTER — TELEPHONE (OUTPATIENT)
Dept: INPATIENT UNIT | Facility: HOSPITAL | Age: 74
End: 2024-07-25
Payer: MEDICARE

## 2024-07-25 LAB
ATRIAL RATE: 62 BPM
P AXIS: 77 DEGREES
P OFFSET: 182 MS
P ONSET: 123 MS
PR INTERVAL: 192 MS
Q ONSET: 219 MS
QRS COUNT: 10 BEATS
QRS DURATION: 90 MS
QT INTERVAL: 432 MS
QTC CALCULATION(BAZETT): 438 MS
QTC FREDERICIA: 437 MS
R AXIS: 61 DEGREES
T AXIS: 60 DEGREES
T OFFSET: 435 MS
VENTRICULAR RATE: 62 BPM

## 2024-07-25 ASSESSMENT — LIFESTYLE VARIABLES: SMOKING_STATUS: SMOKER

## 2024-07-26 LAB — STAPHYLOCOCCUS SPEC CULT: NORMAL

## 2024-08-05 ENCOUNTER — HOSPITAL ENCOUNTER (OUTPATIENT)
Facility: HOSPITAL | Age: 74
Setting detail: OUTPATIENT SURGERY
Discharge: HOME | End: 2024-08-05
Attending: ORTHOPAEDIC SURGERY | Admitting: ORTHOPAEDIC SURGERY
Payer: MEDICARE

## 2024-08-05 ENCOUNTER — ANESTHESIA (OUTPATIENT)
Dept: OPERATING ROOM | Facility: HOSPITAL | Age: 74
End: 2024-08-05
Payer: MEDICARE

## 2024-08-05 ENCOUNTER — ANESTHESIA EVENT (OUTPATIENT)
Dept: OPERATING ROOM | Facility: HOSPITAL | Age: 74
End: 2024-08-05
Payer: MEDICARE

## 2024-08-05 VITALS
DIASTOLIC BLOOD PRESSURE: 64 MMHG | OXYGEN SATURATION: 98 % | WEIGHT: 126 LBS | BODY MASS INDEX: 22.32 KG/M2 | HEART RATE: 69 BPM | TEMPERATURE: 96.8 F | RESPIRATION RATE: 18 BRPM | HEIGHT: 63 IN | SYSTOLIC BLOOD PRESSURE: 128 MMHG

## 2024-08-05 DIAGNOSIS — Z96.652 TOTAL KNEE REPLACEMENT STATUS, LEFT: Primary | ICD-10-CM

## 2024-08-05 PROCEDURE — 2500000001 HC RX 250 WO HCPCS SELF ADMINISTERED DRUGS (ALT 637 FOR MEDICARE OP): Performed by: PHYSICIAN ASSISTANT

## 2024-08-05 PROCEDURE — 7100000002 HC RECOVERY ROOM TIME - EACH INCREMENTAL 1 MINUTE: Performed by: ORTHOPAEDIC SURGERY

## 2024-08-05 PROCEDURE — 3700000001 HC GENERAL ANESTHESIA TIME - INITIAL BASE CHARGE: Performed by: ORTHOPAEDIC SURGERY

## 2024-08-05 PROCEDURE — 97116 GAIT TRAINING THERAPY: CPT | Mod: GP

## 2024-08-05 PROCEDURE — A27447 PR TOTAL KNEE ARTHROPLASTY: Performed by: NURSE ANESTHETIST, CERTIFIED REGISTERED

## 2024-08-05 PROCEDURE — 7100000001 HC RECOVERY ROOM TIME - INITIAL BASE CHARGE: Performed by: ORTHOPAEDIC SURGERY

## 2024-08-05 PROCEDURE — 99100 ANES PT EXTEME AGE<1 YR&>70: CPT | Performed by: ANESTHESIOLOGY

## 2024-08-05 PROCEDURE — 2500000004 HC RX 250 GENERAL PHARMACY W/ HCPCS (ALT 636 FOR OP/ED): Performed by: PHYSICIAN ASSISTANT

## 2024-08-05 PROCEDURE — 3600000005 HC OR TIME - INITIAL BASE CHARGE - PROCEDURE LEVEL FIVE: Performed by: ORTHOPAEDIC SURGERY

## 2024-08-05 PROCEDURE — 97161 PT EVAL LOW COMPLEX 20 MIN: CPT | Mod: GP

## 2024-08-05 PROCEDURE — 7100000010 HC PHASE TWO TIME - EACH INCREMENTAL 1 MINUTE: Performed by: ORTHOPAEDIC SURGERY

## 2024-08-05 PROCEDURE — 2500000004 HC RX 250 GENERAL PHARMACY W/ HCPCS (ALT 636 FOR OP/ED): Performed by: ANESTHESIOLOGY

## 2024-08-05 PROCEDURE — 2500000001 HC RX 250 WO HCPCS SELF ADMINISTERED DRUGS (ALT 637 FOR MEDICARE OP): Performed by: ORTHOPAEDIC SURGERY

## 2024-08-05 PROCEDURE — 3700000002 HC GENERAL ANESTHESIA TIME - EACH INCREMENTAL 1 MINUTE: Performed by: ORTHOPAEDIC SURGERY

## 2024-08-05 PROCEDURE — 2720000007 HC OR 272 NO HCPCS: Performed by: ORTHOPAEDIC SURGERY

## 2024-08-05 PROCEDURE — 2500000004 HC RX 250 GENERAL PHARMACY W/ HCPCS (ALT 636 FOR OP/ED): Performed by: NURSE ANESTHETIST, CERTIFIED REGISTERED

## 2024-08-05 PROCEDURE — 2500000002 HC RX 250 W HCPCS SELF ADMINISTERED DRUGS (ALT 637 FOR MEDICARE OP, ALT 636 FOR OP/ED): Performed by: ORTHOPAEDIC SURGERY

## 2024-08-05 PROCEDURE — C1776 JOINT DEVICE (IMPLANTABLE): HCPCS | Performed by: ORTHOPAEDIC SURGERY

## 2024-08-05 PROCEDURE — 64447 NJX AA&/STRD FEMORAL NRV IMG: CPT | Performed by: ANESTHESIOLOGY

## 2024-08-05 PROCEDURE — 3600000010 HC OR TIME - EACH INCREMENTAL 1 MINUTE - PROCEDURE LEVEL FIVE: Performed by: ORTHOPAEDIC SURGERY

## 2024-08-05 PROCEDURE — 2500000001 HC RX 250 WO HCPCS SELF ADMINISTERED DRUGS (ALT 637 FOR MEDICARE OP): Performed by: ANESTHESIOLOGY

## 2024-08-05 PROCEDURE — A27447 PR TOTAL KNEE ARTHROPLASTY: Performed by: ANESTHESIOLOGY

## 2024-08-05 PROCEDURE — 2780000003 HC OR 278 NO HCPCS: Performed by: ORTHOPAEDIC SURGERY

## 2024-08-05 PROCEDURE — 2500000005 HC RX 250 GENERAL PHARMACY W/O HCPCS: Performed by: ANESTHESIOLOGY

## 2024-08-05 PROCEDURE — 2500000004 HC RX 250 GENERAL PHARMACY W/ HCPCS (ALT 636 FOR OP/ED): Mod: JZ | Performed by: ORTHOPAEDIC SURGERY

## 2024-08-05 PROCEDURE — 2500000005 HC RX 250 GENERAL PHARMACY W/O HCPCS: Performed by: NURSE ANESTHETIST, CERTIFIED REGISTERED

## 2024-08-05 PROCEDURE — 7100000009 HC PHASE TWO TIME - INITIAL BASE CHARGE: Performed by: ORTHOPAEDIC SURGERY

## 2024-08-05 DEVICE — TIBIAL COMPONENT
Type: IMPLANTABLE DEVICE | Site: KNEE | Status: FUNCTIONAL
Brand: TRIATHLON

## 2024-08-05 DEVICE — TIBIAL BEARING INSERT - CR
Type: IMPLANTABLE DEVICE | Site: KNEE | Status: FUNCTIONAL
Brand: TRIATHLON

## 2024-08-05 DEVICE — PATELLA
Type: IMPLANTABLE DEVICE | Site: KNEE | Status: FUNCTIONAL
Brand: TRIATHLON

## 2024-08-05 DEVICE — CRUCIATE RETAINING FEMORAL
Type: IMPLANTABLE DEVICE | Site: KNEE | Status: FUNCTIONAL
Brand: TRIATHLON

## 2024-08-05 RX ORDER — HYDROCODONE BITARTRATE AND ACETAMINOPHEN 5; 325 MG/1; MG/1
1 TABLET ORAL EVERY 4 HOURS PRN
Status: DISCONTINUED | OUTPATIENT
Start: 2024-08-05 | End: 2024-08-05

## 2024-08-05 RX ORDER — SODIUM CHLORIDE, SODIUM LACTATE, POTASSIUM CHLORIDE, CALCIUM CHLORIDE 600; 310; 30; 20 MG/100ML; MG/100ML; MG/100ML; MG/100ML
100 INJECTION, SOLUTION INTRAVENOUS CONTINUOUS
Status: DISCONTINUED | OUTPATIENT
Start: 2024-08-05 | End: 2024-08-05

## 2024-08-05 RX ORDER — HYDROMORPHONE HYDROCHLORIDE 0.2 MG/ML
0.1 INJECTION INTRAMUSCULAR; INTRAVENOUS; SUBCUTANEOUS EVERY 5 MIN PRN
Status: DISCONTINUED | OUTPATIENT
Start: 2024-08-05 | End: 2024-08-05 | Stop reason: HOSPADM

## 2024-08-05 RX ORDER — HYDROMORPHONE HYDROCHLORIDE 0.2 MG/ML
0.2 INJECTION INTRAMUSCULAR; INTRAVENOUS; SUBCUTANEOUS EVERY 5 MIN PRN
Status: DISCONTINUED | OUTPATIENT
Start: 2024-08-05 | End: 2024-08-05 | Stop reason: HOSPADM

## 2024-08-05 RX ORDER — ACETAMINOPHEN 325 MG/1
975 TABLET ORAL ONCE
Status: COMPLETED | OUTPATIENT
Start: 2024-08-05 | End: 2024-08-05

## 2024-08-05 RX ORDER — MIDAZOLAM HYDROCHLORIDE 1 MG/ML
1 INJECTION, SOLUTION INTRAMUSCULAR; INTRAVENOUS ONCE AS NEEDED
Status: DISCONTINUED | OUTPATIENT
Start: 2024-08-05 | End: 2024-08-05 | Stop reason: HOSPADM

## 2024-08-05 RX ORDER — IBUPROFEN 800 MG/1
400 TABLET ORAL EVERY 6 HOURS PRN
Status: DISCONTINUED | OUTPATIENT
Start: 2024-08-05 | End: 2024-08-05 | Stop reason: HOSPADM

## 2024-08-05 RX ORDER — LIDOCAINE HYDROCHLORIDE 20 MG/ML
INJECTION, SOLUTION EPIDURAL; INFILTRATION; INTRACAUDAL; PERINEURAL AS NEEDED
Status: DISCONTINUED | OUTPATIENT
Start: 2024-08-05 | End: 2024-08-05

## 2024-08-05 RX ORDER — TIZANIDINE 4 MG/1
4 TABLET ORAL EVERY 8 HOURS PRN
Qty: 21 TABLET | Refills: 0 | Status: CANCELLED | OUTPATIENT
Start: 2024-08-05

## 2024-08-05 RX ORDER — CYCLOBENZAPRINE HCL 10 MG
10 TABLET ORAL 3 TIMES DAILY PRN
Status: DISCONTINUED | OUTPATIENT
Start: 2024-08-05 | End: 2024-08-05 | Stop reason: HOSPADM

## 2024-08-05 RX ORDER — ONDANSETRON HYDROCHLORIDE 2 MG/ML
4 INJECTION, SOLUTION INTRAVENOUS ONCE AS NEEDED
Status: DISCONTINUED | OUTPATIENT
Start: 2024-08-05 | End: 2024-08-05 | Stop reason: HOSPADM

## 2024-08-05 RX ORDER — ASPIRIN 81 MG/1
81 TABLET ORAL 2 TIMES DAILY
Qty: 60 TABLET | Refills: 0 | Status: SHIPPED | OUTPATIENT
Start: 2024-08-06 | End: 2024-09-05

## 2024-08-05 RX ORDER — HYDRALAZINE HYDROCHLORIDE 20 MG/ML
5 INJECTION INTRAMUSCULAR; INTRAVENOUS EVERY 30 MIN PRN
Status: DISCONTINUED | OUTPATIENT
Start: 2024-08-05 | End: 2024-08-05 | Stop reason: HOSPADM

## 2024-08-05 RX ORDER — FENTANYL CITRATE 50 UG/ML
INJECTION, SOLUTION INTRAMUSCULAR; INTRAVENOUS CONTINUOUS PRN
Status: DISCONTINUED | OUTPATIENT
Start: 2024-08-05 | End: 2024-08-05

## 2024-08-05 RX ORDER — ONDANSETRON HYDROCHLORIDE 2 MG/ML
INJECTION, SOLUTION INTRAVENOUS AS NEEDED
Status: DISCONTINUED | OUTPATIENT
Start: 2024-08-05 | End: 2024-08-05

## 2024-08-05 RX ORDER — MORPHINE SULFATE 2 MG/ML
2 INJECTION, SOLUTION INTRAMUSCULAR; INTRAVENOUS EVERY 2 HOUR PRN
Status: DISCONTINUED | OUTPATIENT
Start: 2024-08-05 | End: 2024-08-05 | Stop reason: HOSPADM

## 2024-08-05 RX ORDER — LIDOCAINE HYDROCHLORIDE 10 MG/ML
0.1 INJECTION INFILTRATION; PERINEURAL ONCE
Status: DISCONTINUED | OUTPATIENT
Start: 2024-08-05 | End: 2024-08-05 | Stop reason: HOSPADM

## 2024-08-05 RX ORDER — MIDAZOLAM HYDROCHLORIDE 1 MG/ML
INJECTION, SOLUTION INTRAMUSCULAR; INTRAVENOUS AS NEEDED
Status: DISCONTINUED | OUTPATIENT
Start: 2024-08-05 | End: 2024-08-05

## 2024-08-05 RX ORDER — OXYCODONE HYDROCHLORIDE 5 MG/1
5 TABLET ORAL EVERY 4 HOURS PRN
Status: DISCONTINUED | OUTPATIENT
Start: 2024-08-05 | End: 2024-08-05 | Stop reason: HOSPADM

## 2024-08-05 RX ORDER — CEFAZOLIN SODIUM 2 G/100ML
2 INJECTION, SOLUTION INTRAVENOUS EVERY 8 HOURS
Status: COMPLETED | OUTPATIENT
Start: 2024-08-05 | End: 2024-08-05

## 2024-08-05 RX ORDER — PROPOFOL 10 MG/ML
INJECTION, EMULSION INTRAVENOUS CONTINUOUS PRN
Status: DISCONTINUED | OUTPATIENT
Start: 2024-08-05 | End: 2024-08-05

## 2024-08-05 RX ORDER — SODIUM CHLORIDE, SODIUM LACTATE, POTASSIUM CHLORIDE, CALCIUM CHLORIDE 600; 310; 30; 20 MG/100ML; MG/100ML; MG/100ML; MG/100ML
INJECTION, SOLUTION INTRAVENOUS CONTINUOUS PRN
Status: DISCONTINUED | OUTPATIENT
Start: 2024-08-05 | End: 2024-08-05

## 2024-08-05 RX ORDER — KETOROLAC TROMETHAMINE 15 MG/ML
15 INJECTION, SOLUTION INTRAMUSCULAR; INTRAVENOUS EVERY 6 HOURS
Status: DISCONTINUED | OUTPATIENT
Start: 2024-08-05 | End: 2024-08-05 | Stop reason: HOSPADM

## 2024-08-05 RX ORDER — CEFAZOLIN SODIUM 1 G/50ML
1 SOLUTION INTRAVENOUS EVERY 8 HOURS
Qty: 50 ML | Refills: 0 | Status: SHIPPED | OUTPATIENT
Start: 2024-08-05 | End: 2024-08-05 | Stop reason: HOSPADM

## 2024-08-05 RX ORDER — CELECOXIB 200 MG/1
200 CAPSULE ORAL ONCE
Status: COMPLETED | OUTPATIENT
Start: 2024-08-05 | End: 2024-08-05

## 2024-08-05 RX ORDER — GABAPENTIN 600 MG/1
600 TABLET ORAL ONCE
Status: COMPLETED | OUTPATIENT
Start: 2024-08-05 | End: 2024-08-05

## 2024-08-05 RX ORDER — CEFAZOLIN SODIUM 2 G/100ML
2 INJECTION, SOLUTION INTRAVENOUS ONCE
Status: COMPLETED | OUTPATIENT
Start: 2024-08-05 | End: 2024-08-05

## 2024-08-05 RX ORDER — OXYCODONE AND ACETAMINOPHEN 5; 325 MG/1; MG/1
2 TABLET ORAL EVERY 6 HOURS PRN
Qty: 40 TABLET | Refills: 0 | Status: SHIPPED | OUTPATIENT
Start: 2024-08-05 | End: 2024-08-15

## 2024-08-05 RX ORDER — OXYCODONE HYDROCHLORIDE 10 MG/1
10 TABLET ORAL EVERY 4 HOURS PRN
Status: DISCONTINUED | OUTPATIENT
Start: 2024-08-05 | End: 2024-08-05 | Stop reason: HOSPADM

## 2024-08-05 RX ORDER — DOCUSATE SODIUM 100 MG/1
100 CAPSULE, LIQUID FILLED ORAL 2 TIMES DAILY
Qty: 20 CAPSULE | Refills: 0 | Status: SHIPPED | OUTPATIENT
Start: 2024-08-05 | End: 2024-08-15

## 2024-08-05 RX ORDER — CYCLOBENZAPRINE HCL 10 MG
10 TABLET ORAL 3 TIMES DAILY PRN
Qty: 15 TABLET | Refills: 0 | Status: SHIPPED | OUTPATIENT
Start: 2024-08-05 | End: 2024-08-10

## 2024-08-05 RX ORDER — ASPIRIN 81 MG/1
81 TABLET ORAL DAILY
Qty: 60 TABLET | Refills: 0 | Status: SHIPPED | OUTPATIENT
Start: 2024-08-06 | End: 2024-08-05

## 2024-08-05 RX ORDER — TRANEXAMIC ACID 650 MG/1
1300 TABLET ORAL ONCE
Status: COMPLETED | OUTPATIENT
Start: 2024-08-05 | End: 2024-08-05

## 2024-08-05 RX ORDER — SODIUM CHLORIDE, SODIUM LACTATE, POTASSIUM CHLORIDE, CALCIUM CHLORIDE 600; 310; 30; 20 MG/100ML; MG/100ML; MG/100ML; MG/100ML
100 INJECTION, SOLUTION INTRAVENOUS CONTINUOUS
Status: DISCONTINUED | OUTPATIENT
Start: 2024-08-05 | End: 2024-08-05 | Stop reason: HOSPADM

## 2024-08-05 RX ORDER — ALBUTEROL SULFATE 0.83 MG/ML
2.5 SOLUTION RESPIRATORY (INHALATION) ONCE AS NEEDED
Status: DISCONTINUED | OUTPATIENT
Start: 2024-08-05 | End: 2024-08-05 | Stop reason: HOSPADM

## 2024-08-05 SDOH — HEALTH STABILITY: MENTAL HEALTH: CURRENT SMOKER: 1

## 2024-08-05 ASSESSMENT — PAIN - FUNCTIONAL ASSESSMENT
PAIN_FUNCTIONAL_ASSESSMENT: 0-10

## 2024-08-05 ASSESSMENT — PAIN SCALES - PAIN ASSESSMENT IN ADVANCED DEMENTIA (PAINAD)
TOTALSCORE: COLD APPLIED
TOTALSCORE: MEDICATION (SEE MAR)

## 2024-08-05 ASSESSMENT — COGNITIVE AND FUNCTIONAL STATUS - GENERAL
WALKING IN HOSPITAL ROOM: A LITTLE
CLIMB 3 TO 5 STEPS WITH RAILING: A LITTLE
MOVING FROM LYING ON BACK TO SITTING ON SIDE OF FLAT BED WITH BEDRAILS: A LITTLE
STANDING UP FROM CHAIR USING ARMS: A LITTLE
TURNING FROM BACK TO SIDE WHILE IN FLAT BAD: A LITTLE
MOVING TO AND FROM BED TO CHAIR: A LITTLE
MOBILITY SCORE: 18

## 2024-08-05 ASSESSMENT — PAIN SCALES - GENERAL
PAINLEVEL_OUTOF10: 5 - MODERATE PAIN
PAINLEVEL_OUTOF10: 6
PAINLEVEL_OUTOF10: 7
PAINLEVEL_OUTOF10: 6
PAINLEVEL_OUTOF10: 7
PAINLEVEL_OUTOF10: 6
PAIN_LEVEL: 0
PAINLEVEL_OUTOF10: 7
PAINLEVEL_OUTOF10: 5 - MODERATE PAIN
PAINLEVEL_OUTOF10: 5 - MODERATE PAIN
PAINLEVEL_OUTOF10: 7

## 2024-08-05 ASSESSMENT — ACTIVITIES OF DAILY LIVING (ADL): ADL_ASSISTANCE: INDEPENDENT

## 2024-08-05 ASSESSMENT — PAIN DESCRIPTION - ORIENTATION
ORIENTATION: LEFT
ORIENTATION: LEFT

## 2024-08-05 ASSESSMENT — PAIN DESCRIPTION - LOCATION
LOCATION: KNEE
LOCATION: KNEE

## 2024-08-05 NOTE — OP NOTE
Knee Replacement Total Cement Unilat (L) Operative Note     Date: 2024  OR Location: [facility] OR    Name: Kathie Lott, : 1950, Age: 74 y.o., MRN: [id number], Sex: female    Diagnosis  * No Diagnosis Codes entered * * No Diagnosis Codes entered *     Procedures  Knee Replacement Total Cement Unilat  51133 - [facility] ARTHRP KNE CONDYLE&PLATU MEDIAL&LAT COMPARTMENTS      Surgeons      * Shaq Recinos - Primary    Resident/Fellow/Other Assistant:  Surgeons and Role:  * No surgeons found with a matching role *    Procedure Summary  Anesthesia: Spinal  ASA: II  Anesthesia Staff: Anesthesiologist: Odilia Mcallister MD  CRNA: ALYSA Berry-CRNA  Estimated Blood Loss: Less than 100 mL  Intra-op Medications:   Administrations occurring from 0800 to 1045 on 24:   Medication Name Total Dose   ceFAZolin (Ancef) 2 g in dextrose (iso)  mL 2 g     Tourniquet time 18 minutes         Anesthesia Record               Intraprocedure I/O Totals          Intake    ceFAZolin (Ancef) 2 g in dextrose (iso)  mL 100.00 mL    Total Intake 100 mL          Specimen: No specimens collected     Staff:   Circulator: Maxine Cancinoub Person: Jeffrey  Scrub Person: Chalo         Drains and/or Catheters: * None in log *    Tourniquet Times:     Total Tourniquet Time Documented:  Thigh (Left) - 18 minutes  Total: Thigh (Left) - 18 minutes      Implants:  Implants       Type Name Action Serial No.      Joint COMPONENT, CR FEMORAL, P3, BEADED W/PA, LEFT - [id number] Implanted      Joint BASEPLATE, TRIATHLON TRITANIUM, SIZE 3, 44MM - [id number] Implanted      Joint INSERT, TIBIAL, TRIATHLON CR X3, SZ-3, 9MM - [id number] Implanted      Joint PATELLA, TRIATHLON, ASYMMETRIC,  SIZE A32 - [id number] Implanted               Findings: See below    Indications: Kathie Lott is an 74 y.o. female who is having surgery for M17.12 LEFT KNEE OSTEOARTHRITIS.  All conservative measures failed    The patient was seen in the preoperative area.  The risks, benefits, complications, treatment options, non-operative alternatives, expected recovery and outcomes were discussed with the patient. The possibilities of reaction to medication, pulmonary aspiration, injury to surrounding structures, bleeding, recurrent infection, the need for additional procedures, failure to diagnose a condition, and creating a complication requiring transfusion or operation were discussed with the patient. The patient concurred with the proposed plan, giving informed consent.  The site of surgery was properly noted/marked if necessary per policy. The patient has been actively warmed in preoperative area. Preoperative antibiotics given 20 minutes preop venous thrombosis prophylaxis mechanical contralateral    Procedure Details: The patient was brought to the operating suite identified and induced into successful spinal anaesthesia. Next the left lower extremity was prepped and draped in the usual sterile fashion distal to a well-padded tourniquet. The limb was then exsanguinated of blood using gravity, and the tourniquet was then inflated to 250 mmhg. A timeout was then performed.  Incision was made on the anterior aspect of the knee, midline dissection was carried out through the subcutaneous tissue until the extensor mechanism was then encountered. A medial parapatellar arthrotomy was performed using the Bovie cautery, the patella was carefully inverted revealing the following arthritic findings: Severe osteoarthritis involving the entire medial femoral condyle and matching arthritis involving the tibial plateau.. Total knee replacement then began by making a drill hole in the intracondylar notch, the intramedullary oleg with a distal femoral cutting guide was set at 4 degrees of valgus. Distal femur was then cut using an oscillating saw. The rotational guide was then inserted and the distal femur was sized to a number 3. The anterior posterior and chamfer cuts were then made using  an oscillating saw.  The anterior cruciate ligament was removed. The PCL was  protected a medial release was performed and the proximal tibial subluxed anteriorally. The proximal tibial cut was then made using an oscillating saw, neutral medial to lateral with a slight posterior slope. The undersurface of the patella was also removed using oscillating saw. The following implants were then used to trial reduced the knee: 3 femoral component, 3 tibial component, 32 mm patella, 9 mm liner trial  The knee was then found to be very balanced in all ranges and for this reason the surfaces of bone were prepared by drilling  holes in the distal femur and the undersurface of the patella, A V keel slot and 4  holes were drilled in the proximal tibia.   The tourniquet was then released at this time and all obvious bleeding points were carefully coagulated. The  implants were inserted in the following order : A 3 triathlon Tritanium Tibia baseplate, 9 mm trial insert, 3 triathlon Press Fit Cruciate Retaining femoral component, and the 32 mm triathlon Tritanium metal backed patellar component. Once all implants were confirmed to be bone to bone the permanent 9 mm Traithlon CR polyethylene insert was then secured to the tibial baseplate, making sure there was no soft tissue impingement.  The medial parapatellar incision was closed using figure-of-eight absorbable sutures, and running barbed #1 monofilament suture. The subcutaneous tissue was closed with interrupted and running absorbable suture, and a glued mesh dressing was then used to seal the incision. Sterile dressing applied the patient was then aroused from anesthesia and transferred to the postanesthesia care unit in stable condition recover fully from this anesthesia. All instrument and needle counts were correct.  The first assistant, physician assistant, was present for the entire operation and was a key portion of the surgical team, being responsible for  aiding in positioning exposure vital organ protection and closure.  Complications:  None; patient tolerated the procedure well.    Disposition: PACU - hemodynamically stable.  Condition: stable         Additional Details:     Attending Attestation:     Shaq Recinos  Phone Number: [phone]

## 2024-08-05 NOTE — PROGRESS NOTES
"Kathie Lott is a 74 y.o. female  presenting with No Principal Problem: There is no principal problem currently on the Problem List. Please update the Problem List and refresh..    Subjective   Ms. Lott is sitting up in her PACU bed, alert & talkative, tells me that her pain is controlled. She did well in therapy, has voided and is tolerating oral intake. She is looking forward to going home today.        Objective     Physical Exam  Vitals reviewed.   HENT:      Head: Normocephalic.      Mouth/Throat:      Mouth: Mucous membranes are moist.      Pharynx: Oropharynx is clear.   Eyes:      Extraocular Movements: Extraocular movements intact.   Cardiovascular:      Rate and Rhythm: Normal rate.   Pulmonary:      Effort: Pulmonary effort is normal.   Abdominal:      Palpations: Abdomen is soft.   Musculoskeletal:      Comments: Left knee dressing is dry and intact.  light touch sensation is intact, ankle dorsiflexion/plantar flexion is intact. DP 2+/2 palpable     Skin:     General: Skin is warm and dry.      Capillary Refill: Capillary refill takes less than 2 seconds.   Neurological:      General: No focal deficit present.      Mental Status: She is alert. Mental status is at baseline.   Psychiatric:         Mood and Affect: Mood normal.         Last Recorded Vitals  Blood pressure 142/80, pulse 68, temperature 36 °C (96.8 °F), temperature source Temporal, resp. rate 18, height 1.588 m (5' 2.5\"), weight 57.2 kg (126 lb), SpO2 98%.  Intake/Output last 3 Shifts:  No intake/output data recorded.    Relevant Results      Scheduled medications  ketorolac, 15 mg, intravenous, q6h  lidocaine, 0.1 mL, subcutaneous, Once  oxygen, , inhalation, Continuous - 02/gases      Continuous medications  lactated Ringer's, 100 mL/hr      PRN medications  PRN medications: albuterol, cyclobenzaprine, hydrALAZINE, HYDROmorphone, HYDROmorphone, HYDROmorphone, ibuprofen, midazolam, morphine, ondansetron, oxyCODONE, oxyCODONE, promethazine " (Phenergan) 6.25 mg in sodium chloride 0.9% 50 mL IV  No results found for this or any previous visit (from the past 24 hour(s)).              ECG 12 lead    Result Date: 8/3/2024  Normal sinus rhythm Possible Left atrial enlargement Borderline ECG When compared with ECG of 23-MAR-2023 13:45, No significant change was found Confirmed by Magdalena Morris ([id number]) on 8/3/2024 2:59:38 PM              Assessment/Plan   Active Problems:  There are no active Hospital Problems.    POD #0 s/p left TKA  Continue PT/OT, WBAT left LE  Reviewed am labs  Multimodal pain regimen  knee dressing to be removed on post op day #7  VTE prophylaxis: aspirin 81mg BID x 30days  Will discharge home when appropriate with [facility]  Follow up with Dr. Recinos as directed         I spent 25 minutes in the professional and overall care of this patient.      Bhavna Byrnes PA-C

## 2024-08-05 NOTE — ANESTHESIA POSTPROCEDURE EVALUATION
Patient: Kathie Lott    Procedure Summary       Date: 08/05/24 Room / Location: [facility] OR 02 / Virtual [facility] OR    Anesthesia Start: 0805 Anesthesia Stop: 0953    Procedure: Knee Replacement Total Cement Unilat (Left: Knee) Diagnosis: (M17.12 LEFT KNEE OSTEOARTHRITIS)    Surgeons: Shaq Recinos MD Responsible Provider: Odilia Mcallister MD    Anesthesia Type: spinal ASA Status: 2            Anesthesia Type: spinal    Vitals Value Taken Time   /57 08/05/24 1215   Temp 36.7 °C (98.1 °F) 08/05/24 1215   Pulse 70 08/05/24 1227   Resp 30 08/05/24 1227   SpO2 95 % 08/05/24 1227   Vitals shown include unfiled device data.    Anesthesia Post Evaluation    Patient location during evaluation: PACU  Patient participation: complete - patient participated  Level of consciousness: sleepy but conscious, responsive to painful stimuli, responsive to light touch, sedated and responsive to physical stimuli  Pain score: 0  Pain management: satisfactory to patient  Multimodal analgesia pain management approach  Airway patency: patent  Two or more strategies used to mitigate risk of obstructive sleep apnea  Cardiovascular status: acceptable, hemodynamically stable and stable  Respiratory status: acceptable, unassisted, spontaneous ventilation, nonlabored ventilation and nasal cannula  Hydration status: balanced  Postoperative Nausea and Vomiting: none        There were no known notable events for this encounter.

## 2024-08-05 NOTE — ANESTHESIA PREPROCEDURE EVALUATION
Patient: Kathie Lott    Procedure Information       Date/Time: 08/05/24 0800    Procedure: Knee Replacement Total Cement Unilat (Left: Knee)    Location: [facility] OR 02 / [facility] OR    Surgeons: Shaq Recinos MD            Relevant Problems   No relevant active problems       Clinical information reviewed:   Tobacco  Allergies  Meds  Problems  Med Hx  Surg Hx   Fam Hx  Soc   Hx        NPO Detail:  NPO/Void Status  Date of Last Liquid: 08/05/24  Time of Last Liquid: 0500  Date of Last Solid: 08/04/24  Time of Last Solid: 2100  Time of Last Void: 0600         Physical Exam    Airway  Mallampati: II  TM distance: >3 FB  Neck ROM: full     Cardiovascular - normal exam  Rhythm: regular  Rate: normal     Dental - normal exam     Pulmonary - normal exam  Breath sounds clear to auscultation     Abdominal   (+) obese  Abdomen: soft  Bowel sounds: normal           Anesthesia Plan    History of general anesthesia?: yes  History of complications of general anesthesia?: no    ASA 2     general, regional and MAC   (Spinal first choice, GA backup plan, post-op Adductor Canal Block)  The patient is a current smoker.  Patient was previously instructed to abstain from smoking on day of procedure.  Patient smoked on day of procedure.  Education provided regarding risk of obstructive sleep apnea.  intravenous induction   Postoperative administration of opioids is intended.  Anesthetic plan and risks discussed with patient.  Use of blood products discussed with patient who.    Plan discussed with CRNA.

## 2024-08-05 NOTE — PROGRESS NOTES
Physical Therapy    Physical Therapy Evaluation & Treatment    Patient Name: Kathie Lott  MRN: [id number]  Today's Date: 8/5/2024   Time Calculation  Start Time: 1510  Stop Time: 1535  Time Calculation (min): 25 min    Assessment/Plan   PT Assessment  PT Assessment Results: Decreased strength, Decreased range of motion, Decreased endurance, Impaired balance, Decreased mobility, Decreased safety awareness, Pain  Rehab Prognosis: Good  Evaluation/Treatment Tolerance: Patient tolerated treatment well  Medical Staff Made Aware: Yes  Strengths: Access to adaptive/assistive products, Capable of completing ADLs semi/independent, Support and attitude of living partners, Support of extended family/friends  End of Session Communication: Bedside nurse  Assessment Comment: Pt is a 75 y/o female admitted s/p L TKA with Dr. Recinos 8/5. Pt presents with pain, decreased strength, ROM, endurance and balance. Pt able to tolerate bed mobility, supine/seated exercises, gait and stair training. She is functioning below baseline level of function and will benefit from skilled therapy during stay to improve overall functional mobility and safety awareness. Upon discharge pt will benefit from low intensity therapy for continued improvement in functional mobility.     End of Session Patient Position: Bed, 3 rail up, Alarm off, not on at start of session (call light within reach)   IP OR SWING BED PT PLAN  Inpatient or Swing Bed: Inpatient  PT Plan  Treatment/Interventions: Bed mobility, Transfer training, Gait training, Stair training, Balance training, Neuromuscular re-education, Strengthening, Endurance training, Range of motion, Therapeutic exercise, Therapeutic activity, Home exercise program, Positioning, Postural re-education  PT Plan: Ongoing PT  PT Frequency: BID  PT Discharge Recommendations: Low intensity level of continued care  Equipment Recommended upon Discharge: Wheeled walker  PT Recommended Transfer Status: Assist x1, Assistive  device, Contact guard  PT - OK to Discharge: Yes (Once medically cleared.)    Subjective     General Visit Information:  General  Reason for Referral: s/p L TKA with Dr. Recinos 8/5.  Referred By: Dr. Recinos  Past Medical History Relevant to Rehab: None per EMR  Family/Caregiver Present: No  Prior to Session Communication: Bedside nurse  Patient Position Received: Bed, 3 rail up, Alarm off, caregiver present (RN present)  Preferred Learning Style: verbal, visual, written  General Comment: Pt pleasant and agreeable to work with therapy.  Home Living:  Home Living  Type of Home: House  Lives With: Spouse  Home Adaptive Equipment: Walker rolling or standard, Cane  Home Layout: Two level, 1/2 bath on main level, Bed/bath upstairs, Able to live on main level with bedroom/bathroom, Stairs to alternate level with rails  Alternate Level Stairs-Number of Steps: 12 HARJIT with R HR to bed/bath  Home Access: Stairs to enter with rails  Entrance Stairs-Rails: None  Entrance Stairs-Number of Steps: 2 HARJIT then landing then 2 HARJIT  Bathroom Shower/Tub: Tub/shower unit  Bathroom Equipment: Grab bars in shower, Shower chair with back  Home Living Comments: Pt reports her  will be home to assist upom discharge. Pt also reports her sister lives nearby to assist as needed.  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[employer])  Prior Function Comments: (+) drives, (-) falls  Precautions:  Precautions  Hearing/Visual Limitations: Wears glasses  LE Weight Bearing Status: Weight Bearing as Tolerated (LLE)  Medical Precautions: Fall precautions    Objective   Pain:  Pain Assessment  Pain Assessment: 0-10  0-10 (Numeric) Pain Score: 6  Pain Type:  Acute pain, Surgical pain  Pain Location: Knee  Pain Orientation: Left  Pain Interventions: Repositioned, Elevated  Cognition:  Cognition  Overall Cognitive Status: Within Functional Limits  Orientation Level: Oriented X4    General Assessments:     Activity Tolerance  Endurance: Tolerates 10 - 20 min exercise with multiple rests    Sensation  Light Touch: No apparent deficits  Sensation Comment: Pt reports n/t in LLE 2/2 surgery.    Static Sitting Balance  Static Sitting-Balance Support: Bilateral upper extremity supported, Feet supported  Static Sitting-Level of Assistance: Close supervision    Static Standing Balance  Static Standing-Balance Support: Bilateral upper extremity supported  Static Standing-Level of Assistance: Contact guard  Dynamic Standing Balance  Dynamic Standing-Balance Support: Bilateral upper extremity supported  Dynamic Standing-Comments: CGA with progression to SBA    Functional Assessments:     Bed Mobility  Bed Mobility: Yes  Bed Mobility 1  Bed Mobility 1: Supine to sitting, Scooting, Sitting to supine  Level of Assistance 1: Close supervision  Bed Mobility Comments 1: HOB elevated    Transfers  Transfer: Yes  Transfer 1  Transfer From 1: Bed to  Transfer to 1: Stand  Technique 1: Sit to stand  Transfer Device 1: Walker, Gait belt  Transfer Level of Assistance 1: Contact guard, Minimal verbal cues  Trials/Comments 1: VCs for hand placement and body mechanics  Transfers 2  Transfer From 2: Stand to, Toilet to  Transfer to 2: Toilet, Stand  Technique 2: Sit to stand, Stand to sit  Transfer Device 2: Walker, Gait belt  Transfer Level of Assistance 2: Contact guard, Minimal verbal cues  Trials/Comments 2: VCs for hand placement, body mechanics and to extend LLE when descending to sit to minimize pain.  Transfers 3  Transfer From 3: Stand to  Transfer to 3: Bed  Technique 3: Stand to sit  Transfer Device 3: Walker, Gait belt  Transfer Level of Assistance 3:  (SBA)    Ambulation/Gait  Training  Ambulation/Gait Training Performed: Yes  Ambulation/Gait Training 1  Surface 1: Level tile  Device 1: Rolling walker  Gait Support Devices: Gait belt  Assistance 1: Contact guard, Minimal verbal cues (with progression to SBA)  Quality of Gait 1: Diminished heel strike (decreased joaquina, decreased stance time on LLE)  Comments/Distance (ft) 1: x3 trials; ~10 ft then static sitting rest break on toilet, ~100 ft then stair training; ~100 ft; pt able to ambulate in reciprocal gait pattern (VCs for proper safety awareness and sequencing)    Stairs  Stairs: Yes  Stairs  Rails 1: Right  Device 1: Railing, Single point cane (cane on LUE per pt request)  Support Devices 1: Gait belt  Assistance 1: Contact guard  Comment/Number of Steps 1: x2 trials, ascend/descend 4 HARJIT in step to gait pattern (Pt educated on proper sequencing with stair negotiation and pt able to teach back)    Extremity/Trunk Assessments:     RLE   RLE : Within Functional Limits  LLE   LLE :  (Knee AROM limited 2/2 recent surgery. Strength not formally assessed 2/2 recent surgery; however, assumed >/= 3/5 strength based on functional mobility)    Treatments:  Pt able to tolerate the following activities during today's treatment session. Pt instructed/educated throughout the session on safety awareness, body mechanics and proper hand placement.     Therapeutic Exercise  Therapeutic Exercise Performed: Yes  Therapeutic Exercise Activity 1: Pt provided with LE HEP handout consisting of LE exercises to maintain/improve overall LE strength/ROM.  Therapeutic Exercise Activity 2: Supine: AP x20 BL, QS x10 BL, GS x10, heel slides RLE AROM x10, heel slides LLE AAROM x10 within available ROM  Therapeutic Exercise Activity 3: Seated EOB: LAQ RLE AROM x10, LAQ LLE AROM within available ROM    Ambulation/Gait Training  Ambulation/Gait Training Performed: Yes  Ambulation/Gait Training 1  Surface 1: Level tile  Device 1: Rolling walker  Gait Support Devices:  Gait belt  Assistance 1: Contact guard, Minimal verbal cues (with progression to SBA)  Quality of Gait 1: Diminished heel strike (decreased joaquina, decreased stance time on LLE)  Comments/Distance (ft) 1: x3 trials; ~10 ft then static sitting rest break on toilet, ~100 ft then stair training; ~100 ft; pt able to ambulate in reciprocal gait pattern (VCs for proper safety awareness and sequencing)    Transfers  Transfer: Yes  Transfer 1  Transfer From 1: Bed to  Transfer to 1: Stand  Technique 1: Sit to stand  Transfer Device 1: Walker, Gait belt  Transfer Level of Assistance 1: Contact guard, Minimal verbal cues  Trials/Comments 1: VCs for hand placement and body mechanics  Transfers 2  Transfer From 2: Stand to, Toilet to  Transfer to 2: Toilet, Stand  Technique 2: Sit to stand, Stand to sit  Transfer Device 2: Walker, Gait belt  Transfer Level of Assistance 2: Contact guard, Minimal verbal cues  Trials/Comments 2: VCs for hand placement, body mechanics and to extend LLE when descending to sit to minimize pain.  Transfers 3  Transfer From 3: Stand to  Transfer to 3: Bed  Technique 3: Stand to sit  Transfer Device 3: Walker, Gait belt  Transfer Level of Assistance 3:  (SBA)    Stairs  Stairs: Yes  Stairs  Rails 1: Right  Device 1: Railing, Single point cane (cane on LUE per pt request)  Support Devices 1: Gait belt  Assistance 1: Contact guard  Comment/Number of Steps 1: x2 trials, ascend/descend 4 HARJIT in step to gait pattern (Pt educated on proper sequencing with stair negotiation and pt able to teach back)    Outcome Measures:  [facility] Basic Mobility  Turning from your back to your side while in a flat bed without using bedrails: A little  Moving from lying on your back to sitting on the side of a flat bed without using bedrails: A little  Moving to and from bed to chair (including a wheelchair): A little  Standing up from a chair using your arms (e.g. wheelchair or bedside chair): A little  To walk in hospital  room: A little  Climbing 3-5 steps with railing: A little  Basic Mobility - Total Score: 18    Encounter Problems       Encounter Problems (Active)       Mobility       Pt will be able to ambulate >/= 250 ft with LRD SBA with good safety awareness.        Start:  08/05/24    Expected End:  08/19/24            Pt will complete supine, seated and standing exercises to maintain/improve overall strength with minimal verbal cues.         Start:  08/05/24    Expected End:  08/19/24            Pt will be able to negotiate ascending/descending 12 HARJIT with use of R HR SBA with sufficient foot clearance and good safety awareness for safe home going.         Start:  08/05/24    Expected End:  08/19/24               PT Transfers       Pt will be able to complete all bed mobility tasks independently from flat HOB.        Start:  08/05/24    Expected End:  08/19/24            Pt will be able to complete all transfers with LRD mod I demonstrating good safety awareness and proper body mechanics.        Start:  08/05/24    Expected End:  08/19/24                   Education Documentation  Handouts, taught by Zoie De Souza PT at 8/5/2024  4:56 PM.  Learner: Patient  Readiness: Acceptance  Method: Explanation, Demonstration, Handout, Teach-back  Response: Verbalizes Understanding, Demonstrated Understanding, Needs Reinforcement    Precautions, taught by Zoie De Souza PT at 8/5/2024  4:56 PM.  Learner: Patient  Readiness: Acceptance  Method: Explanation, Demonstration, Handout, Teach-back  Response: Verbalizes Understanding, Demonstrated Understanding, Needs Reinforcement    Body Mechanics, taught by Zoie De Souza PT at 8/5/2024  4:56 PM.  Learner: Patient  Readiness: Acceptance  Method: Explanation, Demonstration, Handout, Teach-back  Response: Verbalizes Understanding, Demonstrated Understanding, Needs Reinforcement    Home Exercise Program, taught by Zoie De Souza PT at 8/5/2024  4:56 PM.  Learner:  Patient  Readiness: Acceptance  Method: Explanation, Demonstration, Handout, Teach-back  Response: Verbalizes Understanding, Demonstrated Understanding, Needs Reinforcement    Mobility Training, taught by Zoie De Souza PT at 8/5/2024  4:56 PM.  Learner: Patient  Readiness: Acceptance  Method: Explanation, Demonstration, Handout, Teach-back  Response: Verbalizes Understanding, Demonstrated Understanding, Needs Reinforcement    Education Comments  No comments found.

## 2024-08-05 NOTE — ANESTHESIA PROCEDURE NOTES
Peripheral Block    Patient location during procedure: pre-op  Start time: 8/5/2024 11:02 AM  End time: 8/5/2024 11:06 AM  Reason for block: at surgeon's request and post-op pain management  Staffing  Performed: attending   Authorized by: Odilia Mcallister MD    Performed by: Odilia Mcallister MD  Preanesthetic Checklist  Completed: patient identified, IV checked, site marked, risks and benefits discussed, surgical consent, monitors and equipment checked, pre-op evaluation and timeout performed   Timeout performed at: 8/5/2024 11:00 AM  Peripheral Block  Patient position: laying flat  Prep: ChloraPrep  Patient monitoring: heart rate, cardiac monitor and continuous pulse ox  Block type: adductor canal  Laterality: left  Injection technique: single-shot  Guidance: ultrasound guided  Local infiltration: lidocaine  Infiltration strength: 2 %  Dose: 5 mL  Needle  Needle type: pencil-tip   Needle gauge: 21 G  Needle length: 10 cm  Needle localization: ultrasound guidance and anatomical landmarks  Needle insertion depth: 7 cm  Test dose: negative  Assessment  Injection assessment: negative aspiration for heme, no paresthesia on injection, incremental injection and local visualized surrounding nerve on ultrasound  Paresthesia pain: none  Heart rate change: no  Slow fractionated injection: yes  Additional Notes  24 ml of 0.5% Bupivacaine with Epinephrine 1:200 000 mixed with 100 mcg of Clonidine and 8 mg of Decadron. Slow, incremental injection of the mix: 22 ml into the Adductor Canal, the remaining 4 ml used for the Anterior femoral Cutaneous Nerve Field Block with consistently negative aspirations between bouts of injections. Patient tolerated procedure well without complications.              
Spinal Block    Patient location during procedure: OR  Start time: 8/5/2024 8:07 AM  End time: 8/5/2024 8:12 AM  Reason for block: primary anesthetic  Staffing  Performed: CRNA   Authorized by: Odilia Mcallister MD    Performed by: ALYSA Berry-CRNA    Preanesthetic Checklist  Completed: patient identified, IV checked, site marked, risks and benefits discussed, surgical consent, monitors and equipment checked, pre-op evaluation, timeout performed and sterile techniques followed  Block Timeout  RN/Licensed healthcare professional reads aloud to the Anesthesia provider and entire team: Patient identity, procedure with side and site, patient position, and as applicable the availability of implants/special equipment/special requirements.    Timeout performed at: 8/5/2024 8:05 AM  Spinal Block  Patient position: sitting  Prep: Betadine  Sterility prep: cap, drape, gloves, gown, hand hygiene and mask  Sedation level: light sedation  Patient monitoring: blood pressure and continuous pulse oximetry  Approach: midline  Vertebral space: L3-4  Injection technique: single-shot  Needle  Needle type: pencil-point (Pencan)   Needle gauge: 25 G  Needle length: 3.5 in  Free flowing CSF: yes    Assessment  Sensory level: T10 bilateral  Block outcome: pain improved  Procedure assessment: patient tolerated procedure well with no immediate complications          
not examined

## 2024-08-05 NOTE — BRIEF OP NOTE
Date: 2024  OR Location: [facility] OR    Name: Kathie Lott, : 1950, Age: 74 y.o., MRN: [id number], Sex: female    Diagnosis  * No Diagnosis Codes entered * * No Diagnosis Codes entered *     Procedures  Knee Replacement Total Cement Unilat  81832 - SC ARTHRP KNE CONDYLE&PLATU MEDIAL&LAT COMPARTMENTS      Surgeons      * Shaq Recinos - Primary    Resident/Fellow/Other Assistant:  Surgeons and Role:  * No surgeons found with a matching role *    Procedure Summary  Anesthesia: Spinal  ASA: II  Anesthesia Staff: Anesthesiologist: Odilia Mcallister MD  CRNA: ALYSA Berry-CRNA  Estimated Blood Loss: Less than 100 mL  Intra-op Medications:   Administrations occurring from 0800 to 1045 on 24:   Medication Name Total Dose   ceFAZolin (Ancef) 2 g in dextrose (iso)  mL 2 g              Anesthesia Record               Intraprocedure I/O Totals          Intake    ceFAZolin (Ancef) 2 g in dextrose (iso)  mL 100.00 mL    Total Intake 100 mL          Specimen: No specimens collected     Staff:   Circulator: Maxine  Scrub Person: Jeffrey  Scrub Person: Chalo    Tourniquet time 18 minutes      Findings: See operative note    Complications:  None; patient tolerated the procedure well.     Disposition: PACU - hemodynamically stable.  Condition: stable  Specimens Collected: No specimens collected  Attending Attestation:     Shaq Recinos  Phone Number: [phone]

## 2024-08-05 NOTE — DISCHARGE INSTRUCTIONS
Follow up in 2 weeks, you may remove large Ace bandage in 48 hours, rewrap with a single Ace bandage if desired.  Keep dressing on do not remove  Report any drainage bleeding discharge fever excessive pain.  Take pain medication as instructed  Take aspirin 81 mg twice daily starting tomorrow for 30 days  Follow instructions from physical therapy  Use crutches, walker, or cane as needed.

## 2024-08-05 NOTE — DISCHARGE SUMMARY
Discharge Diagnosis    Discharge summary      This patient Kathie Lott was admitted to the hospital on 8/5/2024  after undergoing Procedure(s) (LRB):  Knee Replacement Total Cement Unilat (Left) without complications.      No significant or unexpected findings or abnormal ortho imaging were noted during the hospital stay    Hospital course      Patient tolerated surgical procedure well and there was no complications. Patient progressed adequately through their recovery during hospital stay including PT and rehabilitation.    Patient was then D/C on  to home  in stable condition.  Patient was instructed on the use of pain medications, the signs and symptoms of infection, and was given our number to call should they have any questions or concerns following discharge.    Based on my clinical judgment, the patient was provided with a 7-day prescription for opioid medication at 30 MED, indicated for treatment of acute pain in the setting of recent left TKA. OARRS report was run and has demonstrated an appropriate time course.  The patient has been provided with counseling pertaining to safe use of opioid medication.    Pertinent Physical Exam At Time of Discharge  Alert, oriented x 3, cooperative with examination.     Examination of the left lower   extremity reveals left knee  dressing is intact without drainage.  No charline-incisional ecchymosis.  EHL, plantarflexion, dorsiflexion are 4+/5.  Sensory intact light touch in the deep/superficial/common peroneal, saphenous, tibial, sural nerve distributions.  DP and popliteal pulses are 2+ with capillary refill less than 2 seconds.  Negative Homans' sign.      Home Medications     Medication List      START taking these medications     aspirin 81 mg EC tablet; Take 1 tablet (81 mg) by mouth 2 times a day.   Do not fill before August 6, 2024.; Start taking on: August 6, 2024   cyclobenzaprine 10 mg tablet; Commonly known as: Flexeril; Take 1 tablet   (10 mg) by mouth 3 times a  day as needed for muscle spasms for up to 5   days.   docusate sodium 100 mg capsule; Commonly known as: Colace; Take 1   capsule (100 mg) by mouth 2 times a day for 10 days. Stop taking this   medication if you have diarrhea   oxyCODONE-acetaminophen 5-325 mg tablet; Commonly known as: Percocet;   Take 2 tablets by mouth every 6 hours if needed for severe pain (7 - 10)   for up to 10 days.     CONTINUE taking these medications     PARoxetine 20 mg tablet; Commonly known as: Paxil   rosuvastatin 40 mg tablet; Commonly known as: Crestor     STOP taking these medications     chlorhexidine 0.12 % solution; Commonly known as: Peridex           Patient may bear weight as tolerated to operative extremity with use of walker for assistance with ambulation   Surgical dressing to be removed pod7 and incision left open to air  Aspirin 81mg BID for DVT prophylaxis started on 8/6 and to be taken for 30  days  Follow up with surgeon in 2 weeks        Outpatient Follow-Up  No future appointments.        Alfred Byrnes PA-C

## 2024-11-07 DIAGNOSIS — N28.1 RENAL CYST: Primary | ICD-10-CM

## 2024-11-21 ENCOUNTER — APPOINTMENT (OUTPATIENT)
Dept: RADIOLOGY | Facility: CLINIC | Age: 74
End: 2024-11-21
Payer: MEDICARE

## 2024-12-10 ENCOUNTER — APPOINTMENT (OUTPATIENT)
Dept: UROLOGY | Facility: CLINIC | Age: 74
End: 2024-12-10
Payer: MEDICARE

## 2024-12-10 VITALS
TEMPERATURE: 98.4 F | HEIGHT: 63 IN | SYSTOLIC BLOOD PRESSURE: 127 MMHG | HEART RATE: 80 BPM | DIASTOLIC BLOOD PRESSURE: 85 MMHG | BODY MASS INDEX: 22.04 KG/M2 | WEIGHT: 124.4 LBS

## 2024-12-10 DIAGNOSIS — N28.1 RENAL CYST, LEFT: Primary | ICD-10-CM

## 2024-12-10 PROCEDURE — 3008F BODY MASS INDEX DOCD: CPT | Performed by: UROLOGY

## 2024-12-10 PROCEDURE — 1159F MED LIST DOCD IN RCRD: CPT | Performed by: UROLOGY

## 2024-12-10 PROCEDURE — 99203 OFFICE O/P NEW LOW 30 MIN: CPT | Performed by: UROLOGY

## 2024-12-10 RX ORDER — ASPIRIN 81 MG/1
81 TABLET ORAL DAILY
COMMUNITY

## 2024-12-10 ASSESSMENT — PATIENT HEALTH QUESTIONNAIRE - PHQ9
2. FEELING DOWN, DEPRESSED OR HOPELESS: NOT AT ALL
1. LITTLE INTEREST OR PLEASURE IN DOING THINGS: NOT AT ALL
SUM OF ALL RESPONSES TO PHQ9 QUESTIONS 1 AND 2: 0

## 2024-12-10 ASSESSMENT — ENCOUNTER SYMPTOMS
DEPRESSION: 0
OCCASIONAL FEELINGS OF UNSTEADINESS: 0
LOSS OF SENSATION IN FEET: 0

## 2024-12-10 NOTE — PROGRESS NOTES
Subjective   Patient ID: Kathie Lott is a 74 y.o. female new patient who presents for Renal Cyst.    HPI  The patient's insurance company needed her to be seen in person before the getting the MRI. The patient denies flank pain and hematuria.     The patient's son has moved to [place] and will be there for 3 years.   Her  has Parkinson disease.     MRI of Lumbar Spine 24    (Left-sided parapelvic cysts versus hydronephrosis. If there is concern for obstructive uropathy, CT of the abdomen and pelvis without and with contrast to include delayed imaging recommended.)    Past Medical History  Past Medical History:   Diagnosis Date    Cysts of unspecified eye, unspecified eyelid 2019    Epithelial inclusion cyst of eyelid    Edema of left lower eyelid 2019    Edema of left lower eyelid    Edema of right lower eyelid 2019    Edema of right lower eyelid    History of basal cell cancer     Osteoarthritis     Other abnormal and inconclusive findings on diagnostic imaging of breast 2015    Abnormal mammogram    Personal history of other endocrine, nutritional and metabolic disease     History of hyperlipidemia        Surgical History  Past Surgical History:   Procedure Laterality Date    BREAST BIOPSY Left     Biopsy Breast Open    CATARACT EXTRACTION       SECTION, LOW TRANSVERSE      JOINT REPLACEMENT      Right total knee replacement         Social History  She reports that she has quit smoking. Her smoking use included cigarettes. She has never used smokeless tobacco. She reports that she does not drink alcohol and does not use drugs.    Family History  Family History   Problem Relation Name Age of Onset    Cancer Mother         Medications    Current Outpatient Medications:     PARoxetine (Paxil) 20 mg tablet, Take 1 tablet (20 mg) by mouth once daily., Disp: , Rfl:     rosuvastatin (Crestor) 40 mg tablet, Take 1 tablet (40 mg) by mouth once daily., Disp: , Rfl:     aspirin 81  mg EC tablet, Take 1 tablet (81 mg) by mouth once daily., Disp: , Rfl:     cyclobenzaprine (Flexeril) 10 mg tablet, Take 1 tablet (10 mg) by mouth 3 times a day as needed for muscle spasms for up to 5 days., Disp: 15 tablet, Rfl: 0     Allergies  Patient has no known allergies.     Review of Systems  A 12 system review was completed and is negative with the exception of those signs and symptoms noted in the history of present illness.    Objective   Physical Exam  General: in NAD, appears stated age  Head: normocephalic, atraumatic  Neck: supple; trachea is midline  Respiratory: normal effort, no use of accessory muscles  Cardiovascular: no peripheral edema  Abdomen: soft, nondistended, nontender, no rebound or guarding, no organomegaly, no CVA tenderness, no hernia  Lymphatic: no lymphadenopathy noted  Skin: normal turgor, no rashes  Neurologic: grossly intact, oriented to person/place/time  Psychiatric: mode and affect appropriate    Assessment/Plan   Problem List Items Addressed This Visit    None  Visit Diagnoses         Codes    Renal cyst, left    -  Primary N28.1          I personally reviewed the patient's previous MRI of the lumbar spine.  There appears to be a complex left parapelvic cyst.  Hydronephrosis cannot be completely ruled out.  I think the patient needs a dedicated study of her kidney.  I would prefer an MRI with and without contrast as this provides the best anatomic detail.  This was previously denied, but we will again try and obtain authorization.  We discussed the possible outcomes based on what the MRI might show. I will message the patient with the MRI findings and to schedule further follow-up.      Follow-up with MRI Kidney for continued management of renal mass.     Scribe Attestation  By signing my name below, I, Rich Licea   attest that this documentation has been prepared under the direction and in the presence of Hunter Clayton MD.

## 2024-12-11 ENCOUNTER — APPOINTMENT (OUTPATIENT)
Dept: RADIOLOGY | Facility: CLINIC | Age: 74
End: 2024-12-11
Payer: MEDICARE

## 2024-12-17 DIAGNOSIS — N28.1 RENAL CYST, LEFT: ICD-10-CM

## 2025-01-06 ENCOUNTER — APPOINTMENT (OUTPATIENT)
Dept: RADIOLOGY | Facility: HOSPITAL | Age: 75
End: 2025-01-06
Payer: MEDICARE

## 2025-01-06 ENCOUNTER — APPOINTMENT (OUTPATIENT)
Dept: RADIOLOGY | Facility: CLINIC | Age: 75
End: 2025-01-06
Payer: MEDICARE

## 2025-01-14 ENCOUNTER — ANCILLARY PROCEDURE (OUTPATIENT)
Dept: URGENT CARE | Age: 75
End: 2025-01-14
Payer: MEDICARE

## 2025-01-14 ENCOUNTER — OFFICE VISIT (OUTPATIENT)
Dept: URGENT CARE | Age: 75
End: 2025-01-14
Payer: MEDICARE

## 2025-01-14 VITALS
DIASTOLIC BLOOD PRESSURE: 77 MMHG | HEART RATE: 77 BPM | TEMPERATURE: 98 F | HEIGHT: 62 IN | OXYGEN SATURATION: 97 % | RESPIRATION RATE: 16 BRPM | WEIGHT: 123 LBS | SYSTOLIC BLOOD PRESSURE: 127 MMHG | BODY MASS INDEX: 22.63 KG/M2

## 2025-01-14 DIAGNOSIS — R05.1 ACUTE COUGH: Primary | ICD-10-CM

## 2025-01-14 DIAGNOSIS — M54.50 BILATERAL LOW BACK PAIN, UNSPECIFIED CHRONICITY, UNSPECIFIED WHETHER SCIATICA PRESENT: ICD-10-CM

## 2025-01-14 DIAGNOSIS — R05.1 ACUTE COUGH: ICD-10-CM

## 2025-01-14 PROCEDURE — 1159F MED LIST DOCD IN RCRD: CPT

## 2025-01-14 PROCEDURE — 71046 X-RAY EXAM CHEST 2 VIEWS: CPT

## 2025-01-14 PROCEDURE — 1036F TOBACCO NON-USER: CPT

## 2025-01-14 PROCEDURE — 3008F BODY MASS INDEX DOCD: CPT

## 2025-01-14 PROCEDURE — 99203 OFFICE O/P NEW LOW 30 MIN: CPT

## 2025-01-14 RX ORDER — METHOCARBAMOL 500 MG/1
500 TABLET, FILM COATED ORAL EVERY 6 HOURS PRN
Qty: 25 TABLET | Refills: 0 | Status: SHIPPED | OUTPATIENT
Start: 2025-01-14

## 2025-01-14 RX ORDER — BENZONATATE 200 MG/1
200 CAPSULE ORAL EVERY 8 HOURS PRN
Qty: 30 CAPSULE | Refills: 0 | Status: SHIPPED | OUTPATIENT
Start: 2025-01-14

## 2025-01-14 ASSESSMENT — ENCOUNTER SYMPTOMS
WHEEZING: 0
PALPITATIONS: 0
BACK PAIN: 1
SHORTNESS OF BREATH: 0
WEAKNESS: 0
HEMATURIA: 0
TROUBLE SWALLOWING: 0
COUGH: 1
FEVER: 0
DYSURIA: 0
VOMITING: 0
NUMBNESS: 0

## 2025-01-14 NOTE — PROGRESS NOTES
Subjective   Patient ID: Kathie Lott is a 74 y.o. female. They present today with a chief complaint of Cough (Productive (clear) going on x5 days).    HISTORY OF PRESENT ILLNESS:    Patient presents to the clinic for cough productive of clear mucus and bilateral low back pain. States she initially developed symptoms 5-6 days ago. Reports being prescribed a Z-stefan by her PCP, which she completed; sinus congestion has improved but cough has been persistent. Back pain has also failed to improve (pt has a history of sciatica and is scheduled to follow up for possible injections to alleviate this. Pt is unsure if this pain is related to sciatica or her acute illness). Taking Robitussin DM, with temporary relief of cough. Consents to CXR to r/o pneumonia today. Pt has albuterol inhaler at home from a prior illness. Denies hx of chronic respiratory issues. Denies fevers, dyspnea, or wheezing.    Past Medical History  Allergies as of 01/14/2025    (No Known Allergies)       Current Outpatient Medications   Medication Instructions    aspirin 81 mg, oral, Daily    benzonatate (TESSALON) 200 mg, oral, Every 8 hours PRN, Do not crush or chew.    cyclobenzaprine (FLEXERIL) 10 mg, oral, 3 times daily PRN    methocarbamol (ROBAXIN) 500 mg, oral, Every 6 hours PRN, May cause drowsiness. Use precautions.    PARoxetine (PAXIL) 20 mg, Daily    rosuvastatin (CRESTOR) 40 mg, Daily         Past Medical History:   Diagnosis Date    Cysts of unspecified eye, unspecified eyelid 05/02/2019    Epithelial inclusion cyst of eyelid    Edema of left lower eyelid 05/02/2019    Edema of left lower eyelid    Edema of right lower eyelid 05/02/2019    Edema of right lower eyelid    History of basal cell cancer     Osteoarthritis     Other abnormal and inconclusive findings on diagnostic imaging of breast 03/04/2015    Abnormal mammogram    Personal history of other endocrine, nutritional and metabolic disease     History of hyperlipidemia       Past  "Surgical History:   Procedure Laterality Date    BREAST BIOPSY Left     Biopsy Breast Open    CATARACT EXTRACTION       SECTION, LOW TRANSVERSE      JOINT REPLACEMENT      Right total knee replacement        reports that she has quit smoking. Her smoking use included cigarettes. She has never used smokeless tobacco. She reports that she does not drink alcohol and does not use drugs.    Review of Systems    Review of Systems   Constitutional:  Negative for fever.   HENT:  Positive for congestion. Negative for drooling and trouble swallowing.    Respiratory:  Positive for cough. Negative for shortness of breath and wheezing.    Cardiovascular:  Negative for palpitations.   Gastrointestinal:  Negative for vomiting.   Genitourinary:  Negative for dysuria, hematuria and urgency.   Musculoskeletal:  Positive for back pain.   Skin:  Negative for rash.   Neurological:  Negative for weakness and numbness.                               Objective    Vitals:    25 1406   BP: 127/77   BP Location: Right arm   Patient Position: Sitting   Pulse: 77   Resp: 16   Temp: 36.7 °C (98 °F)   TempSrc: Oral   SpO2: 97%   Weight: 55.8 kg (123 lb)   Height: 1.575 m (5' 2\")     No LMP recorded. Patient is postmenopausal.  PHYSICAL EXAMINATION:    Physical Exam  Vitals and nursing note reviewed.   Constitutional:       General: She is not in acute distress.     Appearance: Normal appearance. She is not ill-appearing.   HENT:      Head: Normocephalic and atraumatic.      Right Ear: Tympanic membrane, ear canal and external ear normal.      Left Ear: Tympanic membrane, ear canal and external ear normal.      Nose: Nose normal.      Mouth/Throat:      Mouth: Mucous membranes are moist.      Pharynx: Oropharynx is clear. No oropharyngeal exudate or posterior oropharyngeal erythema.   Eyes:      General:         Right eye: No discharge.         Left eye: No discharge.      Extraocular Movements: Extraocular movements intact.      " Conjunctiva/sclera: Conjunctivae normal.   Cardiovascular:      Rate and Rhythm: Normal rate and regular rhythm.      Heart sounds: Normal heart sounds.   Pulmonary:      Effort: Pulmonary effort is normal. No respiratory distress.      Breath sounds: Normal breath sounds. No stridor. No wheezing, rhonchi or rales.   Musculoskeletal:      Cervical back: Normal range of motion and neck supple.   Lymphadenopathy:      Cervical: No cervical adenopathy.   Skin:     General: Skin is warm and dry.      Findings: No rash.   Neurological:      General: No focal deficit present.      Mental Status: She is alert and oriented to person, place, and time.   Psychiatric:         Mood and Affect: Mood normal.         Behavior: Behavior normal.        Procedures    No results found for this visit on 01/14/25.    === 01/14/25 ===    XR CHEST 2 VIEWS    - Impression -  No evidence of acute intrathoracic abnormality. Age-indeterminate  mild vertebral compression fracture at T7.    Signed by: Virgilio Salvador 1/14/2025 2:58 PM  Dictation workstation:   [id number]     Diagnostic study results (if any) were reviewed by Mary Kulkarni PA-C.    Assessment/Plan   Allergies, medications, history, and pertinent labs/EKGs/Imaging reviewed by Mary Kulkarni PA-C.     Orders and Diagnoses  Diagnoses and all orders for this visit:  Acute cough  -     XR chest 2 views; Future  -     benzonatate (Tessalon) 200 mg capsule; Take 1 capsule (200 mg) by mouth every 8 hours if needed for cough (Dry cough). Do not crush or chew.  Bilateral low back pain, unspecified chronicity, unspecified whether sciatica present  -     methocarbamol (Robaxin) 500 mg tablet; Take 1 tablet (500 mg) by mouth every 6 hours if needed for muscle spasms. May cause drowsiness. Use precautions.  -     Referral to Orthopaedic Surgery; Future      Medical Admin Record    Given overall well appearance, vital signs, history, and exam as above, there is no indication for further  emergent testing/intervention at this time.      I discussed with the patient my clinical thoughts at this time given the above and we had a shared decision-making conversation in a patient-centered decision-making model on how to proceed forward. Pt was instructed on the importance of close follow-up. They were told that an urgent care diagnosis is often a preliminary impression and that definitive care is often not able to be given in the urgent care setting. Pt was educated that close follow-up is essential for good health and good outcomes. Patient was provided with the following instructions:              May take benzonatate for symptomatic relief of dry cough as needed. Add guaifenesin for mucus clearance if needed.    Tylenol or ibuprofen for back pain as needed.     May take Robaxin for additional relief of back pain only as directed/as needed.     Cool mist humidifier in room at night while sleeping. Sleep in semi elevated position.    Tea with honey, throat lozenges, vapor rub, voice rest.     Plenty of fluids and rest.      Good hand hygiene.      Follow up with orthopedics in the next 7 days.        Clinical impression as well as limitations of available testing/examination, all discussed with patient. Pt is well at this time in the urgent care. They are comfortable with the present assessment and plan to be discharged home. Discussed with them close outpatient follow up, reassessment, and possible further testing/treatment via their PCP/specialist; they agree, understand, and are comfortable with this plan. Pt given the opportunity to ask questions prior to discharge and all questions were answered at this time. Via our discussion, the patient was advised of warning signs and instructions were reviewed. Strict ED precautions were given, acknowledged, and understood. Discussed with the patient/family that it is okay to return to the urgent care at any time for anything. Advised to present to the ED if  present condition changes/worsens or if they develop new symptoms at any time after discharge. Also, advised to go to the ED if they cannot establish outpatient follow-up in time frame specified above. Pt verbalized understanding and agreement with plan and instructions. Discussed the need for close follow up with their primary care provider and/or specialist for further testing/treatment/care/consultation in the short time frame as noted above - they understand these instructions and agree to close follow up for these reasons. Patient discharged home in stable condition.      Follow Up Instructions  No follow-ups on file.    Patient disposition: Home    Electronically signed by Mary Kulkarni PA-C  3:10 PM

## 2025-01-17 ENCOUNTER — HOSPITAL ENCOUNTER (OUTPATIENT)
Dept: RADIOLOGY | Facility: CLINIC | Age: 75
Discharge: HOME | End: 2025-01-17
Payer: MEDICARE

## 2025-01-17 DIAGNOSIS — N28.1 RENAL CYST: ICD-10-CM

## 2025-01-17 PROCEDURE — 2550000001 HC RX 255 CONTRASTS: Performed by: UROLOGY

## 2025-01-17 PROCEDURE — 74183 MRI ABD W/O CNTR FLWD CNTR: CPT

## 2025-01-17 PROCEDURE — A9575 INJ GADOTERATE MEGLUMI 0.1ML: HCPCS | Performed by: UROLOGY

## 2025-01-17 RX ORDER — GADOTERATE MEGLUMINE 376.9 MG/ML
0.2 INJECTION INTRAVENOUS
Status: COMPLETED | OUTPATIENT
Start: 2025-01-17 | End: 2025-01-17

## 2025-01-17 RX ADMIN — GADOTERATE MEGLUMINE 11.2 ML: 376.9 INJECTION INTRAVENOUS at 14:59

## 2025-01-21 DIAGNOSIS — N28.1 RENAL CYST, LEFT: ICD-10-CM

## 2025-04-01 ENCOUNTER — APPOINTMENT (OUTPATIENT)
Facility: CLINIC | Age: 75
End: 2025-04-01
Payer: MEDICARE

## 2025-04-01 VITALS
TEMPERATURE: 98.7 F | HEART RATE: 72 BPM | BODY MASS INDEX: 22.63 KG/M2 | SYSTOLIC BLOOD PRESSURE: 122 MMHG | HEIGHT: 62 IN | DIASTOLIC BLOOD PRESSURE: 80 MMHG | OXYGEN SATURATION: 96 % | WEIGHT: 123 LBS | RESPIRATION RATE: 16 BRPM

## 2025-04-01 DIAGNOSIS — Z00.00 ROUTINE GENERAL MEDICAL EXAMINATION AT HEALTH CARE FACILITY: Primary | ICD-10-CM

## 2025-04-01 DIAGNOSIS — D04.9 BASAL CELL CARCINOMA (BCC) IN SITU OF SKIN: ICD-10-CM

## 2025-04-01 DIAGNOSIS — Z12.11 SCREENING FOR COLORECTAL CANCER: ICD-10-CM

## 2025-04-01 DIAGNOSIS — L40.9 PSORIASIS: ICD-10-CM

## 2025-04-01 DIAGNOSIS — Z78.0 POST-MENOPAUSAL: ICD-10-CM

## 2025-04-01 DIAGNOSIS — Z11.59 NEED FOR HEPATITIS C SCREENING TEST: ICD-10-CM

## 2025-04-01 DIAGNOSIS — E55.9 HYPOVITAMINOSIS D: ICD-10-CM

## 2025-04-01 DIAGNOSIS — Z87.891 PERSONAL HISTORY OF TOBACCO USE, PRESENTING HAZARDS TO HEALTH: ICD-10-CM

## 2025-04-01 DIAGNOSIS — Z78.0 ASYMPTOMATIC MENOPAUSAL STATE: ICD-10-CM

## 2025-04-01 DIAGNOSIS — R93.1 AGATSTON CAC SCORE, <100: ICD-10-CM

## 2025-04-01 DIAGNOSIS — Z12.31 ENCOUNTER FOR SCREENING MAMMOGRAM FOR BREAST CANCER: ICD-10-CM

## 2025-04-01 DIAGNOSIS — E78.5 HYPERLIPIDEMIA, UNSPECIFIED HYPERLIPIDEMIA TYPE: ICD-10-CM

## 2025-04-01 DIAGNOSIS — Z12.12 SCREENING FOR COLORECTAL CANCER: ICD-10-CM

## 2025-04-01 DIAGNOSIS — Z13.220 SCREENING FOR HYPERLIPIDEMIA: ICD-10-CM

## 2025-04-01 PROCEDURE — G0439 PPPS, SUBSEQ VISIT: HCPCS | Performed by: FAMILY MEDICINE

## 2025-04-01 PROCEDURE — G0009 ADMIN PNEUMOCOCCAL VACCINE: HCPCS | Performed by: FAMILY MEDICINE

## 2025-04-01 PROCEDURE — 1159F MED LIST DOCD IN RCRD: CPT | Performed by: FAMILY MEDICINE

## 2025-04-01 PROCEDURE — 93000 ELECTROCARDIOGRAM COMPLETE: CPT | Performed by: FAMILY MEDICINE

## 2025-04-01 PROCEDURE — 3008F BODY MASS INDEX DOCD: CPT | Performed by: FAMILY MEDICINE

## 2025-04-01 PROCEDURE — 99203 OFFICE O/P NEW LOW 30 MIN: CPT | Performed by: FAMILY MEDICINE

## 2025-04-01 PROCEDURE — 4004F PT TOBACCO SCREEN RCVD TLK: CPT | Performed by: FAMILY MEDICINE

## 2025-04-01 PROCEDURE — 90677 PCV20 VACCINE IM: CPT | Performed by: FAMILY MEDICINE

## 2025-04-01 RX ORDER — CLOBETASOL PROPIONATE 0.5 MG/G
CREAM TOPICAL 2 TIMES DAILY
Qty: 15 G | Refills: 0 | Status: SHIPPED | OUTPATIENT
Start: 2025-04-01 | End: 2025-04-15

## 2025-04-01 RX ORDER — ACETAMINOPHEN 500 MG
TABLET ORAL DAILY
COMMUNITY

## 2025-04-01 ASSESSMENT — ENCOUNTER SYMPTOMS: DEPRESSION: 0

## 2025-04-01 NOTE — PATIENT INSTRUCTIONS
Today we performed your Annual Medicare Wellness Visit.   Your vaccines are up to date. Prevnar 20 given today   Shingrix/Tdap and covid boosters recommended at the local pharmacy  Your routine and preventative care is up to date. You refused colonoscopy in the past so I encourage the cologuard    Left anterior shin with a rash we will try clobetasol  cream and refer you to derm

## 2025-04-01 NOTE — PROGRESS NOTES
"Subjective   Reason for Visit: Kathie Lott is an 74 y.o. female here for a Medicare Wellness visit.          Reviewed all medications by prescribing practitioner or clinical pharmacist (such as prescriptions, OTCs, herbal therapies and supplements) and documented in the medical record.    HPI    Patient Care Team:  Magdalene Santos DO as PCP - General (Family Medicine)     Review of Systems    Objective   Vitals:  /80   Pulse 72   Temp 37.1 °C (98.7 °F)   Resp 16   Ht 1.575 m (5' 2\")   Wt 55.8 kg (123 lb)   SpO2 96%   BMI 22.50 kg/m²       Physical Exam  Constitutional:       General: She is not in acute distress.     Appearance: She is well-developed. She is not diaphoretic.   HENT:      Head: Normocephalic and atraumatic.      Right Ear: Tympanic membrane normal.      Left Ear: Tympanic membrane normal.      Nose: Nose normal.      Mouth/Throat:      Mouth: Mucous membranes are moist.   Eyes:      General: No scleral icterus.     Pupils: Pupils are equal, round, and reactive to light.   Neck:      Thyroid: No thyromegaly.      Vascular: No JVD.   Cardiovascular:      Rate and Rhythm: Normal rate and regular rhythm.      Heart sounds: Normal heart sounds. No murmur heard.     No friction rub. No gallop.   Pulmonary:      Effort: Pulmonary effort is normal. No respiratory distress.      Breath sounds: Normal breath sounds. No wheezing or rales.   Chest:      Chest wall: No tenderness.   Abdominal:      General: Bowel sounds are normal. There is no distension.      Palpations: Abdomen is soft. There is no mass.      Tenderness: There is no abdominal tenderness. There is no rebound.   Musculoskeletal:         General: Normal range of motion.      Cervical back: Normal range of motion and neck supple.   Lymphadenopathy:      Cervical: No cervical adenopathy.   Skin:     General: Skin is warm and dry.   Neurological:      General: No focal deficit present.      Mental Status: She is alert and oriented " to person, place, and time.      Deep Tendon Reflexes: Reflexes normal.   Psychiatric:         Mood and Affect: Mood normal.         Behavior: Behavior normal.         Assessment & Plan  Routine general medical examination at health care facility    Orders:    1 Year Follow Up In Primary Care - Wellness Exam; Future    Comprehensive Metabolic Panel; Future    Agatston CAC score, <100         Post-menopausal         Personal history of tobacco use, presenting hazards to health    Orders:    CT lung screening low dose; Future    Asymptomatic menopausal state    Orders:    XR DEXA bone density; Future    Encounter for screening mammogram for breast cancer    Orders:    BI mammo bilateral screening tomosynthesis; Future    Need for hepatitis C screening test    Orders:    Hepatitis C Antibody; Future    Screening for colorectal cancer    Orders:    Cologuard®; Future    Screening for hyperlipidemia    Orders:    Lipid Panel; Future    Basal cell carcinoma (BCC) in situ of skin    Orders:    Referral to Dermatology    Hypovitaminosis D    Orders:    Vitamin D 25 hydroxy; Future    Hyperlipidemia, unspecified hyperlipidemia type    Orders:    CBC; Future    TSH with reflex to Free T4 if abnormal; Future    ECG 12 Lead    Psoriasis    Orders:    clobetasol (Temovate) 0.05 % cream; Apply topically 2 times a day for 14 days.

## 2025-04-02 ENCOUNTER — PATIENT MESSAGE (OUTPATIENT)
Facility: CLINIC | Age: 75
End: 2025-04-02
Payer: MEDICARE

## 2025-04-02 DIAGNOSIS — F41.9 ANXIETY: Primary | ICD-10-CM

## 2025-04-02 LAB
25(OH)D3+25(OH)D2 SERPL-MCNC: 59 NG/ML (ref 30–100)
ALBUMIN SERPL-MCNC: 4.9 G/DL (ref 3.6–5.1)
ALP SERPL-CCNC: 72 U/L (ref 37–153)
ALT SERPL-CCNC: 15 U/L (ref 6–29)
ANION GAP SERPL CALCULATED.4IONS-SCNC: 11 MMOL/L (CALC) (ref 7–17)
AST SERPL-CCNC: 18 U/L (ref 10–35)
BILIRUB SERPL-MCNC: 0.6 MG/DL (ref 0.2–1.2)
BUN SERPL-MCNC: 15 MG/DL (ref 7–25)
CALCIUM SERPL-MCNC: 9.7 MG/DL (ref 8.6–10.4)
CHLORIDE SERPL-SCNC: 105 MMOL/L (ref 98–110)
CHOLEST SERPL-MCNC: 210 MG/DL
CHOLEST/HDLC SERPL: 2.5 (CALC)
CO2 SERPL-SCNC: 25 MMOL/L (ref 20–32)
CREAT SERPL-MCNC: 0.58 MG/DL (ref 0.6–1)
EGFRCR SERPLBLD CKD-EPI 2021: 95 ML/MIN/1.73M2
ERYTHROCYTE [DISTWIDTH] IN BLOOD BY AUTOMATED COUNT: 12.3 % (ref 11–15)
GLUCOSE SERPL-MCNC: 91 MG/DL (ref 65–99)
HCT VFR BLD AUTO: 46.9 % (ref 35–45)
HCV AB SERPL QL IA: NORMAL
HDLC SERPL-MCNC: 84 MG/DL
HGB BLD-MCNC: 15.5 G/DL (ref 11.7–15.5)
LDLC SERPL CALC-MCNC: 107 MG/DL (CALC)
MCH RBC QN AUTO: 30.9 PG (ref 27–33)
MCHC RBC AUTO-ENTMCNC: 33 G/DL (ref 32–36)
MCV RBC AUTO: 93.6 FL (ref 80–100)
NONHDLC SERPL-MCNC: 126 MG/DL (CALC)
PLATELET # BLD AUTO: 301 THOUSAND/UL (ref 140–400)
PMV BLD REES-ECKER: 10.9 FL (ref 7.5–12.5)
POTASSIUM SERPL-SCNC: 4.5 MMOL/L (ref 3.5–5.3)
PROT SERPL-MCNC: 7.1 G/DL (ref 6.1–8.1)
RBC # BLD AUTO: 5.01 MILLION/UL (ref 3.8–5.1)
SODIUM SERPL-SCNC: 141 MMOL/L (ref 135–146)
TRIGL SERPL-MCNC: 101 MG/DL
TSH SERPL-ACNC: 0.86 MIU/L (ref 0.4–4.5)
WBC # BLD AUTO: 8.6 THOUSAND/UL (ref 3.8–10.8)

## 2025-04-02 RX ORDER — PAROXETINE HYDROCHLORIDE 20 MG/1
20 TABLET, FILM COATED ORAL DAILY
Qty: 90 TABLET | Refills: 1 | Status: SHIPPED | OUTPATIENT
Start: 2025-04-02 | End: 2026-04-02

## 2025-04-02 NOTE — PATIENT COMMUNICATION
Seen yesterday as new patient. Paxil formatted, unsure if you wanted to send crestor in as 20mg or 40mg since patient stated she has only been taking 20mg.

## 2025-04-14 DIAGNOSIS — E78.5 HYPERLIPIDEMIA, UNSPECIFIED HYPERLIPIDEMIA TYPE: Primary | ICD-10-CM

## 2025-04-14 RX ORDER — ROSUVASTATIN CALCIUM 40 MG/1
20 TABLET, COATED ORAL DAILY
Qty: 45 TABLET | Refills: 1 | Status: SHIPPED | OUTPATIENT
Start: 2025-04-14 | End: 2026-04-14

## 2025-04-30 ENCOUNTER — HOSPITAL ENCOUNTER (OUTPATIENT)
Dept: RADIOLOGY | Facility: HOSPITAL | Age: 75
Discharge: HOME | End: 2025-04-30
Payer: MEDICARE

## 2025-04-30 VITALS — BODY MASS INDEX: 22.63 KG/M2 | HEIGHT: 62 IN | WEIGHT: 123 LBS

## 2025-04-30 DIAGNOSIS — Z12.31 ENCOUNTER FOR SCREENING MAMMOGRAM FOR BREAST CANCER: ICD-10-CM

## 2025-04-30 PROCEDURE — 77067 SCR MAMMO BI INCL CAD: CPT | Performed by: STUDENT IN AN ORGANIZED HEALTH CARE EDUCATION/TRAINING PROGRAM

## 2025-04-30 PROCEDURE — 77063 BREAST TOMOSYNTHESIS BI: CPT | Performed by: STUDENT IN AN ORGANIZED HEALTH CARE EDUCATION/TRAINING PROGRAM

## 2025-04-30 PROCEDURE — 77067 SCR MAMMO BI INCL CAD: CPT

## 2025-05-04 LAB — NONINV COLON CA DNA+OCC BLD SCRN STL QL: NEGATIVE

## 2025-05-05 ENCOUNTER — TELEPHONE (OUTPATIENT)
Facility: CLINIC | Age: 75
End: 2025-05-05
Payer: MEDICARE

## 2025-11-04 ENCOUNTER — APPOINTMENT (OUTPATIENT)
Dept: DERMATOLOGY | Facility: CLINIC | Age: 75
End: 2025-11-04
Payer: MEDICARE

## (undated) DEVICE — GOWN, SURGICAL, SMARTGOWN, XX-LARGE, STERILE

## (undated) DEVICE — HOOD, STERISHIELD T4 SYSTEM

## (undated) DEVICE — TRAY, SURESTEP, SILICONE DRAINAGE BAG, STATLOCK, 16FR

## (undated) DEVICE — DRAPE, INSTRUMENT, W/POUCH, STERI DRAPE, 7 X 11 IN, DISPOSABLE, STERILE

## (undated) DEVICE — TOWEL PACK, STERILE, 4/PACK, BLUE

## (undated) DEVICE — Device

## (undated) DEVICE — SUTURE, STRATAFIX, SPIRAL MONOCRYL PLUS, 3-0, PS-2 45CM, UNDYED

## (undated) DEVICE — DRAPE, SHEET, THREE QUARTER, FAN FOLD, 57 X 77 IN

## (undated) DEVICE — SKIN CLOSURE SYS, PREMIERPRO EXOFIN, 2-4CM X 30CM, 1.75G TUBE

## (undated) DEVICE — DRESSING, ABDOMINAL, WET PRUF, TENDERSORB, 5 X 9 IN, STERILE

## (undated) DEVICE — BLADE, SAW, SAGITTAL, 25.0 X 1.27 X 90MM

## (undated) DEVICE — SUTURE, STARTAFIX, SYMMETRIC, PDS PLUS, 60CM CT-1

## (undated) DEVICE — SUTURE, VICRYL, 1, 27 IN, CT-1 CR, UNDYED

## (undated) DEVICE — DRESSING, MEPILEX BORDER, POST-OP AG, 4 X 12 IN

## (undated) DEVICE — SUTURE, VICRYL, 2-0, 36 IN, CT-1, UNDYED

## (undated) DEVICE — GOWN, ASTOUND, XL

## (undated) DEVICE — BANDAGE, COMPRESSION, W/CLIP, FLEX-MASTER, DOUBLE LENGTH, 6 IN X 11 YD, LF

## (undated) DEVICE — SOLUTION, IRRIGATION, STERILE WATER, 1000 ML, POUR BOTTLE

## (undated) DEVICE — SOLUTION, IRRIGATION, SODIUM CHLORIDE 0.9%, 1000 ML, POUR BOTTLE

## (undated) DEVICE — GLOVE, SURGICAL, PROTEXIS PI W/NEU-THERA, 8.0, PF, LF

## (undated) DEVICE — SOLUTION, IRRIGATION, USP, SODIUM CHLORIDE 0.9%, 3000 ML, BAG

## (undated) DEVICE — COVER HANDLE LIGHT, STERIS, BLUE, STERILE

## (undated) DEVICE — GLOVE, SURGICAL, PROTEXIS PI , 7.5, PF, LF